# Patient Record
Sex: FEMALE | Race: WHITE | NOT HISPANIC OR LATINO | Employment: OTHER | URBAN - METROPOLITAN AREA
[De-identification: names, ages, dates, MRNs, and addresses within clinical notes are randomized per-mention and may not be internally consistent; named-entity substitution may affect disease eponyms.]

---

## 2019-07-05 ENCOUNTER — TELEPHONE (OUTPATIENT)
Dept: FAMILY MEDICINE | Facility: CLINIC | Age: 74
End: 2019-07-05

## 2019-07-08 ENCOUNTER — OFFICE VISIT (OUTPATIENT)
Dept: FAMILY MEDICINE | Facility: CLINIC | Age: 74
End: 2019-07-08
Payer: MEDICARE

## 2019-07-08 VITALS
TEMPERATURE: 98.2 F | BODY MASS INDEX: 25.67 KG/M2 | WEIGHT: 119 LBS | RESPIRATION RATE: 14 BRPM | HEIGHT: 57 IN | SYSTOLIC BLOOD PRESSURE: 120 MMHG | DIASTOLIC BLOOD PRESSURE: 86 MMHG | OXYGEN SATURATION: 98 % | HEART RATE: 92 BPM

## 2019-07-08 DIAGNOSIS — R06.89 OTHER ABNORMALITIES OF BREATHING: ICD-10-CM

## 2019-07-08 DIAGNOSIS — Z85.42 HISTORY OF ENDOMETRIAL CANCER: ICD-10-CM

## 2019-07-08 DIAGNOSIS — Z00.00 MEDICARE ANNUAL WELLNESS VISIT, SUBSEQUENT: Primary | ICD-10-CM

## 2019-07-08 DIAGNOSIS — R60.0 LOWER EXTREMITY EDEMA: ICD-10-CM

## 2019-07-08 DIAGNOSIS — N39.45 CONTINUOUS LEAKAGE OF URINE: ICD-10-CM

## 2019-07-08 LAB
ALBUMIN SERPL-MCNC: 3.9 G/DL (ref 3.4–5)
ALP SERPL-CCNC: 76 U/L (ref 40–150)
ALT SERPL W P-5'-P-CCNC: 18 U/L (ref 0–50)
ANION GAP SERPL CALCULATED.3IONS-SCNC: 4 MMOL/L (ref 3–14)
AST SERPL W P-5'-P-CCNC: 14 U/L (ref 0–45)
BASOPHILS # BLD AUTO: 0 10E9/L (ref 0–0.2)
BASOPHILS NFR BLD AUTO: 0.7 %
BILIRUB SERPL-MCNC: 0.5 MG/DL (ref 0.2–1.3)
BUN SERPL-MCNC: 12 MG/DL (ref 7–30)
CALCIUM SERPL-MCNC: 9.5 MG/DL (ref 8.5–10.1)
CANCER AG125 SERPL-ACNC: 20 U/ML (ref 0–30)
CHLORIDE SERPL-SCNC: 107 MMOL/L (ref 94–109)
CO2 SERPL-SCNC: 28 MMOL/L (ref 20–32)
CREAT SERPL-MCNC: 0.65 MG/DL (ref 0.52–1.04)
DIFFERENTIAL METHOD BLD: NORMAL
EOSINOPHIL # BLD AUTO: 0 10E9/L (ref 0–0.7)
EOSINOPHIL NFR BLD AUTO: 0.9 %
ERYTHROCYTE [DISTWIDTH] IN BLOOD BY AUTOMATED COUNT: 12.9 % (ref 10–15)
GFR SERPL CREATININE-BSD FRML MDRD: 88 ML/MIN/{1.73_M2}
GLUCOSE SERPL-MCNC: 90 MG/DL (ref 70–99)
HCT VFR BLD AUTO: 39 % (ref 35–47)
HGB BLD-MCNC: 12.7 G/DL (ref 11.7–15.7)
LYMPHOCYTES # BLD AUTO: 1 10E9/L (ref 0.8–5.3)
LYMPHOCYTES NFR BLD AUTO: 22.8 %
MCH RBC QN AUTO: 30.8 PG (ref 26.5–33)
MCHC RBC AUTO-ENTMCNC: 32.6 G/DL (ref 31.5–36.5)
MCV RBC AUTO: 94 FL (ref 78–100)
MONOCYTES # BLD AUTO: 0.4 10E9/L (ref 0–1.3)
MONOCYTES NFR BLD AUTO: 7.7 %
NEUTROPHILS # BLD AUTO: 3.1 10E9/L (ref 1.6–8.3)
NEUTROPHILS NFR BLD AUTO: 67.9 %
NT-PROBNP SERPL-MCNC: 95 PG/ML (ref 0–125)
PLATELET # BLD AUTO: 314 10E9/L (ref 150–450)
POTASSIUM SERPL-SCNC: 3.6 MMOL/L (ref 3.4–5.3)
PROT SERPL-MCNC: 7.2 G/DL (ref 6.8–8.8)
RBC # BLD AUTO: 4.13 10E12/L (ref 3.8–5.2)
SODIUM SERPL-SCNC: 139 MMOL/L (ref 133–144)
WBC # BLD AUTO: 4.6 10E9/L (ref 4–11)

## 2019-07-08 PROCEDURE — 83880 ASSAY OF NATRIURETIC PEPTIDE: CPT | Performed by: FAMILY MEDICINE

## 2019-07-08 PROCEDURE — 86304 IMMUNOASSAY TUMOR CA 125: CPT | Performed by: FAMILY MEDICINE

## 2019-07-08 PROCEDURE — 36415 COLL VENOUS BLD VENIPUNCTURE: CPT | Performed by: FAMILY MEDICINE

## 2019-07-08 PROCEDURE — 80053 COMPREHEN METABOLIC PANEL: CPT | Performed by: FAMILY MEDICINE

## 2019-07-08 PROCEDURE — 85025 COMPLETE CBC W/AUTO DIFF WBC: CPT | Performed by: FAMILY MEDICINE

## 2019-07-08 PROCEDURE — 99213 OFFICE O/P EST LOW 20 MIN: CPT | Mod: 25 | Performed by: FAMILY MEDICINE

## 2019-07-08 PROCEDURE — G0439 PPPS, SUBSEQ VISIT: HCPCS | Performed by: FAMILY MEDICINE

## 2019-07-08 SDOH — HEALTH STABILITY: MENTAL HEALTH: HOW OFTEN DO YOU HAVE A DRINK CONTAINING ALCOHOL?: NEVER

## 2019-07-08 ASSESSMENT — ACTIVITIES OF DAILY LIVING (ADL): CURRENT_FUNCTION: NO ASSISTANCE NEEDED

## 2019-07-08 ASSESSMENT — MIFFLIN-ST. JEOR: SCORE: 918.66

## 2019-07-08 NOTE — LETTER
July 9, 2019      Meera Ruvalcaba  [address] [address]  [facility] [address]        Dear ,    We are writing to inform you of your test results.  Results are within normal parameters.    Resulted Orders      Result Value Ref Range     20 0 - 30 U/mL      Comment:      Assay Method:  Chemiluminescence using Siemens Centaur XP   **Comprehensive metabolic panel FUTURE anytime   Result Value Ref Range    Sodium 139 133 - 144 mmol/L    Potassium 3.6 3.4 - 5.3 mmol/L    Chloride 107 94 - 109 mmol/L    Carbon Dioxide 28 20 - 32 mmol/L    Anion Gap 4 3 - 14 mmol/L    Glucose 90 70 - 99 mg/dL      Comment:      Fasting specimen    Urea Nitrogen 12 7 - 30 mg/dL    Creatinine 0.65 0.52 - 1.04 mg/dL    GFR Estimate 88 >60 mL/min/[1.73_m2]      Comment:      Non  GFR Calc  Starting 12/18/2018, serum creatinine based estimated GFR (eGFR) will be   calculated using the Chronic Kidney Disease Epidemiology Collaboration   (CKD-EPI) equation.      GFR Estimate If Black >90 >60 mL/min/[1.73_m2]      Comment:       GFR Calc  Starting 12/18/2018, serum creatinine based estimated GFR (eGFR) will be   calculated using the Chronic Kidney Disease Epidemiology Collaboration   (CKD-EPI) equation.      Calcium 9.5 8.5 - 10.1 mg/dL    Bilirubin Total 0.5 0.2 - 1.3 mg/dL    Albumin 3.9 3.4 - 5.0 g/dL    Protein Total 7.2 6.8 - 8.8 g/dL    Alkaline Phosphatase 76 40 - 150 U/L    ALT 18 0 - 50 U/L    AST 14 0 - 45 U/L   CBC with platelets and differential   Result Value Ref Range    WBC 4.6 4.0 - 11.0 10e9/L    RBC Count 4.13 3.8 - 5.2 10e12/L    Hemoglobin 12.7 11.7 - 15.7 g/dL    Hematocrit 39.0 35.0 - 47.0 %    MCV 94 78 - 100 fl    MCH 30.8 26.5 - 33.0 pg    MCHC 32.6 31.5 - 36.5 g/dL    RDW 12.9 10.0 - 15.0 %    Platelet Count 314 150 - 450 10e9/L    % Neutrophils 67.9 %    % Lymphocytes 22.8 %    % Monocytes 7.7 %    % Eosinophils 0.9 %    % Basophils 0.7 %    Absolute Neutrophil 3.1  1.6 - 8.3 10e9/L    Absolute Lymphocytes 1.0 0.8 - 5.3 10e9/L    Absolute Monocytes 0.4 0.0 - 1.3 10e9/L    Absolute Eosinophils 0.0 0.0 - 0.7 10e9/L    Absolute Basophils 0.0 0.0 - 0.2 10e9/L    Diff Method Automated Method    BNP-N terminal pro   Result Value Ref Range    N-Terminal Pro Bnp 95 0 - 125 pg/mL      Comment:         Reference range shown and results flagged as abnormal are for the outpatient,   non acute settings. Establishing a baseline value for each individual patient   is useful for follow-up.  Suggested inpatient cut points for confirming diagnosis of CHF in an acute   setting are:   >450 pg/mL (age 18 to less than 50)   >900 pg/mL (age 50 to less than 75)   >1800 pg/mL (75 yrs and older)  An inpatient or emergency department NT-proPBNP <300 pg/mL effectively rules   out acute CHF, with 99% negative predictive value.      If you have any questions or concerns, please call the clinic at the number listed above.     Sincerely,        Herminia Langley MD/bmc

## 2019-07-08 NOTE — PATIENT INSTRUCTIONS
Patient Education   Personalized Prevention Plan  You are due for the preventive services outlined below.  Your care team is available to assist you in scheduling these services.  If you have already completed any of these items, please share that information with your care team to update in your medical record.  Health Maintenance Due   Topic Date Due     Osteoporosis Screening  1945     Hepatitis C Screening  1945     Discuss Advance Directive Planning  1945     Mammogram  1945     Colonscopy  12/11/1955     Diptheria Tetanus Pertussis (DTAP/TDAP/TD) Vaccine (1 - Tdap) 12/11/1970     Cholesterol Lab  12/11/1990     Zoster (Shingles) Vaccine (1 of 2) 12/11/1995     Annual Wellness Visit  12/11/2010     FALL RISK ASSESSMENT  12/11/2010     Pneumococcal Vaccine (1 of 2 - PCV13) 12/11/2010     PHQ-2  01/01/2019

## 2019-07-08 NOTE — PROGRESS NOTES
"SUBJECTIVE:   Meera Ruvalcaba is a 73 year old female who presents for Preventive Visit.        Chief Complaint   Patient presents with     Wellness Visit     Health Maintenance     Patient refused all HM   Patient is a 73-year-old female who comes in to [facility] care and for her wellness visit.  She has a past medical history that is significant for endometrial carcinoma grade II with tumor invasion into the myometrium. This was diagnosed in 2007 . She had a total abdominal hysterectomy and bilateral oophorectomy in August 2007. She was following Dr. Melara at the [facility] for her cancer treatment but has not been to him since 2008.  She has also not had any medical care since 2008.  She also had an aortic lymphadenectomy and fausto pelvic lymphadenectomy as part of her surgery and subsequently developed severe lower extremity edema.      She reports that she developed lower extremity swelling following the surgery and she was wearing  compression stockings and had some form of lymphedema treatment but as stated above this patient has not been seen a doctor in over 10 years.  She reports that the lower extremity swelling has gotten worse in the last few years.  She denies any shortness of breath.  There is no history of any heart problems.    She also has a history of depression but has symptoms seem stable at this time.    Patient is also dealing with severe incontinence ,she has grade 4 cystocele and she says the incontinence has gotten really severe as well in the last few years. She is requesting depends for this.   Are you in the first 12 months of your Medicare coverage?  No    Healthy Habits:     In general, how would you rate your overall health?  Fair    Frequency of exercise:  None    Duration of exercise:  Less than 15 minutes    Do you usually eat at least 4 servings of fruit and vegetables a day, include whole grains    & fiber and avoid regularly eating high fat or \"junk\" foods?  Yes    " Taking medications regularly:  Not Applicable    Barriers to taking medications:  Not applicable    Medication side effects:  Not applicable    Ability to successfully perform activities of daily living:  No assistance needed    Home Safety:  No safety concerns identified    Hearing Impairment:  No hearing concerns    In the past 6 months, have you been bothered by leaking of urine? Yes    In general, how would you rate your overall mental or emotional health?  Fair      PHQ-2 Total Score: 1    Additional concerns today:  Yes    Do you feel safe in your environment? Yes    Do you have a Health Care Directive? Yes: Patient states has Advance Directive and will bring in a copy to clinic.      Fall risk  Fallen 2 or more times in the past year?: No  Any fall with injury in the past year?: No    Cognitive Screening   1) Repeat 3 items (Leader, Season, Table)    2) Clock draw: NORMAL  3) 3 item recall: Recalls 3 objects  Results: 3 items recalled: COGNITIVE IMPAIRMENT LESS LIKELY    Mini-CogTM Copyright JOSE Dixon. Licensed by the author for use in [facility]; reprinted with permission ([email or web address]@.[facility]). All rights reserved.      Do you have sleep apnea, excessive snoring or daytime drowsiness?: yes    Reviewed and updated as needed this visit by clinical staff  Tobacco  Allergies  Meds  Problems  Med Hx  Surg Hx  Fam Hx  Soc Hx          Reviewed and updated as needed this visit by Provider  Tobacco  Allergies  Meds  Problems  Med Hx  Surg Hx  Fam Hx        Social History     Tobacco Use     Smoking status: Never Smoker     Smokeless tobacco: Never Used   Substance Use Topics     Alcohol use: Never     Frequency: Never     If you drink alcohol do you typically have >3 drinks per day or >7 drinks per week? No    Alcohol Use 7/8/2019   Prescreen: >3 drinks/day or >7 drinks/week? No         Handicap application   Concern - incontinence and supplies for this   Onset: ongoing increased in the last 6 mos      Description:   Patient is having problems with stool and urine leakage she has had it for a while but it is increasing and would like to try and get na rx for depends     Intensity: moderate    Progression of Symptoms:  worsening    Accompanying Signs & Symptoms:  Age and surgeries     Previous history of similar problem:   Yes     Precipitating factors:   Worsened by: now she is having stool problems to    Alleviating factors:  Improved by: none     Therapies Tried and outcome: patient does wear the depend and would like a rx for them       Current providers sharing in care for this patient include:   Patient Care Team:  No Ref-Primary, Physician as PCP - General    The following health maintenance items are reviewed in Epic and correct as of today:  Health Maintenance   Topic Date Due     DEXA  1945     HEPATITIS C SCREENING  1945     ADVANCE CARE PLANNING  1945     MAMMO SCREENING  1945     COLONOSCOPY  12/11/1955     DTAP/TDAP/TD IMMUNIZATION (1 - Tdap) 12/11/1970     LIPID  12/11/1990     ZOSTER IMMUNIZATION (1 of 2) 12/11/1995     MEDICARE ANNUAL WELLNESS VISIT  12/11/2010     FALL RISK ASSESSMENT  12/11/2010     PHQ-2  01/01/2019     INFLUENZA VACCINE (1) 09/01/2019     IPV IMMUNIZATION  Aged Out     MENINGITIS IMMUNIZATION  Aged Out     Lab work is in process  Labs reviewed in EPIC  BP Readings from Last 3 Encounters:   07/08/19 120/86    Wt Readings from Last 3 Encounters:   07/08/19 54 kg (119 lb)                  There is no problem list on file for this patient.    History reviewed. No pertinent surgical history.    Social History     Tobacco Use     Smoking status: Never Smoker     Smokeless tobacco: Never Used   Substance Use Topics     Alcohol use: Never     Frequency: Never     Family History   Problem Relation Age of Onset     Cancer Mother         breast and lung     Cancer Father         bone     Prostate Cancer Father      Cancer Maternal Grandfather      Prostate  "Cancer Maternal Grandfather      Cancer Brother          Current Outpatient Medications   Medication Sig Dispense Refill     order for DME Equipment being ordered: Depends 1 Box 3     Allergies   Allergen Reactions     Sulfa Drugs Other (See Comments)     Childhood reaction      Pneumonia Vaccine:Adults age 65+ who received Pneumovax (PPSV23) at 65 years or older: Should be given PCV13 > 1 year after their most recent PPSV23  Mammogram Screening: Patient over age 75, has elected to stop mammography screening.  History of abnormal Pap smear: Status post benign hysterectomy. Health Maintenance and Surgical History updated.    Review of Systems  Constitutional, HEENT, cardiovascular, pulmonary, gi and gu systems are negative, except as otherwise noted.    OBJECTIVE:   /86   Pulse 92   Temp 98.2  F (36.8  C) (Tympanic)   Resp 14   Ht 1.448 m (4' 9\")   Wt 54 kg (119 lb)   SpO2 98%   BMI 25.75 kg/m   Estimated body mass index is 25.75 kg/m  as calculated from the following:    Height as of this encounter: 1.448 m (4' 9\").    Weight as of this encounter: 54 kg (119 lb).  Physical Exam  GENERAL: healthy, alert and no distress  EYES: Eyes grossly normal to inspection, PERRL and conjunctivae and sclerae normal  HENT: ear canals and TM's normal, nose and mouth without ulcers or lesions  NECK: no adenopathy, no asymmetry, masses, or scars and thyroid normal to palpation  RESP: lungs clear to auscultation - no rales, rhonchi or wheezes  BREAST: normal without masses, tenderness or nipple discharge and no palpable axillary masses or adenopathy  CV: regular rate and rhythm, normal S1 S2, no S3 or S4, no murmur, click or rub, no peripheral edema and peripheral pulses strong  ABDOMEN: soft, nontender, no hepatosplenomegaly, no masses and bowel sounds normal  MS: no gross musculoskeletal defects noted, no edema  SKIN: no suspicious lesions or rashes  PSYCH: mentation appears normal, affect normal/bright    Diagnostic " "Test Results:  Pending     ASSESSMENT / PLAN:   1. Medicare annual wellness visit, subsequent  73 yr old female here for her medicare well ness exam with past medical history that is significant for history of endometrial cancer and depression.     2. History of endometrial cancer  -   - JUST IN CASE    3. Lower extremity edema  - Comprehensive metabolic panel FUTURE anytime  - CBC with platelets and differential  - LYMPHEDEMA THERAPY REFERRAL; Future  - JUST IN CASE    4. Continuous leakage of urine  Depends ordered - she may need a consult with Uro- gyn   - order for DME; Equipment being ordered: Depends  Dispense: 1 Box; Refill: 3  - JUST IN CASE    End of Life Planning:  Patient currently has an advanced directive: Yes.  Practitioner is supportive of decision.    COUNSELING:  Reviewed preventive health counseling, as reflected in patient instructions       Regular exercise       Healthy diet/nutrition       Vision screening    Estimated body mass index is 25.75 kg/m  as calculated from the following:    Height as of this encounter: 1.448 m (4' 9\").    Weight as of this encounter: 54 kg (119 lb).         reports that she has never smoked. She has never used smokeless tobacco.      Appropriate preventive services were discussed with this patient, including applicable screening as appropriate for cardiovascular disease, diabetes, osteopenia/osteoporosis, and glaucoma.  As appropriate for age/gender, discussed screening for colorectal cancer, prostate cancer, breast cancer, and cervical cancer. Checklist reviewing preventive services available has been given to the patient.    Reviewed patients plan of care and provided an AVS. The Basic Care Plan (routine screening as documented in Health Maintenance) for Meera meets the Care Plan requirement. This Care Plan has been established and reviewed with the Patient.    Counseling Resources:  ATP IV Guidelines  Pooled Cohorts Equation Calculator  Breast Cancer Risk " Calculator  FRAX Risk Assessment  ICSI Preventive Guidelines  Dietary Guidelines for Americans, 2010  USDA's MyPlate  ASA Prophylaxis  Lung CA Screening    Herminia Langley MD  [facility]    Identified Health Risks:

## 2019-07-09 NOTE — RESULT ENCOUNTER NOTE
Please inform patient that test result was within normal parameters.   Thank you.     Herminia Langley M.D.

## 2019-07-23 ENCOUNTER — HOSPITAL ENCOUNTER (OUTPATIENT)
Dept: PHYSICAL THERAPY | Facility: CLINIC | Age: 74
Setting detail: THERAPIES SERIES
End: 2019-07-23
Attending: FAMILY MEDICINE
Payer: MEDICARE

## 2019-07-23 DIAGNOSIS — R60.0 LOWER EXTREMITY EDEMA: ICD-10-CM

## 2019-07-23 PROCEDURE — 97140 MANUAL THERAPY 1/> REGIONS: CPT | Mod: GP | Performed by: PHYSICAL THERAPIST

## 2019-07-23 PROCEDURE — 97161 PT EVAL LOW COMPLEX 20 MIN: CPT | Mod: GP | Performed by: PHYSICAL THERAPIST

## 2019-07-23 NOTE — PROGRESS NOTES
Lymphedema Evaluation  07/23/19 1300   Quick Adds   Quick Adds Certification   Rehab Discipline   Discipline PT   Type of Visit   Type of visit Initial Edema Evaluation   General Information   Start of care 07/23/19   Referring physician Dr. Langley   Orders Evaluate and treat as indicated   Order date 07/08/19   Medical diagnosis Lower extremity edema   Onset of illness / date of surgery   (August 2007)   Edema onset 07/08/19  (date of referral; Onset September 2007)   Affected body parts RLE;LLE  (R LE > L LE)   Edema etiology Surgery;Cancer with lymph node dissection;Radiation   Location - Cancer with lymph node dissection B groin   Location - Radiation abdomen   Edema etiology comments denies hx of DVTs   Pertinent history of current problem (PT: include personal factors and/or comorbidities that impact the POC; OT: include additional occupational profile info) Pateint reports that she has dealt with edema for years since her surgery in 2007.  Reports her swelling isn't as bad in the winter and gets worse in the summers.  Reports her edema gets worse in the evenings and better with sleeping.   Surgical / medical history reviewed Yes   Prior level of functional mobility independent with AD   Prior treatment ASHLY hose;[facility]   Community support Family / friend caregiver   Patient role / employment history Retired   Living environment House / [facility]   Living environment comments 3-6 TANIA; Flight of stairs to get to the bedroom   Current assistive devices Walker;Standard cane   Fall Risk Screen   Fall screen completed by PT   Have you fallen 2 or more times in the past year? No   Have you fallen and had an injury in the past year? No   Is patient a fall risk? No   Abuse Screen (yes response referral indicated)   Feels Unsafe at Home or Work/School no   Feels Threatened by Someone no   Does Anyone Try to Keep You From Having Contact with Others or Doing Things Outside Your Home? no   Physical Signs of Abuse  "Present no   System Outcome Measures   Outcome Measures Lymphedema   Lymphedema Life Impact Scale (score range 0-72). A higher score indicates greater impairment. 39   Subjective Report   Patient report of symptoms \"tight, full, and heavy\"   Precautions   Precautions comments Denies heart or kidney concerns   Patient / Family Goals   Patient / family goals statement to decrease the swelling   Pain   Patient currently in pain No   Vitals Signs   Heart Rate 89   SpO2 99   Cognitive Status   Orientation Orientation to person, place and time   Level of consciousness Alert   Follows commands and answers questions 100% of the time   Personal safety and judgement Intact   Memory Intact   Edema Exam / Assessment   Skin condition Dryness   Scar No   Stemmer sign Negative   Ulceration No   Girth Measurements   Girth Measurements Refer to separate girth measurement flowsheet   Volume LE   Right LE (mL) 7214.35   Left LE (mL) 5706.85   LE volume comparison RLE volume greater than LLE volume   % difference 20.9%   Range of Motion   ROM No deficits were identified   Strength   Strength No deficits were identified   Right hand  (pounds) n=   Posture   Posture Forward head position   Posture comments seated in w/c for session   Palpation   Palpation denies   Sensory   Sensory perception Light touch   Light touch Intact   Planned Edema Interventions   Planned edema interventions Manual lymph drainage;Gradient compression bandaging;Fit for compression garment;Exercises;Precautions to prevent infection / exacerbation;Education;Manual therapy;ADL training;Skin care / precautions;Scar mobilization;Soft tissue mobilization;Myofascial release;Home management program development   Clinical Impression   Criteria for skilled therapeutic intervention met Yes   Therapy diagnosis B LE secondary lymphedema   Influenced by the following impairments / conditions Edema;Stage 1;Stage 2   Functional limitations due to impairments / conditions " decreased fit of clothing and shoes   Clinical Presentation Stable/Uncomplicated   Clinical Presentation Rationale hx of endometrial cancer 2007; lymph node removal; radiation   Clinical Decision Making (Complexity) Low complexity   Treatment Frequency 2x/week   Treatment duration 12 weeks   Patient / family and/or staff in agreement with plan of care Yes   Risks and benefits of therapy have been explained Yes   Goals   Edema Eval Goals 1;2;3;4   Goal 1   Goal identifier stg 1   Goal description pt to have around the clock tolerance to BLE spandigrip and/or GCB for edema reduction response   Target date 08/13/19   Goal 2   Goal identifier stg 2   Goal description pt and/or friend to be independent with BLE spandigrip and/or GCB for edema reduction response   Target date 08/20/19   Goal 3   Goal identifier ltg 1   Goal description pt to be independent with longterm BLE lymphedema management via HEP, elevation, compression garment wear and MLD (when/if appropriate)   Target date 10/15/19   Goal 4   Goal identifier ltg 2   Goal description pt to have at least 3-5 point improvement on LLIS due to lymphedema reduction response in BLE   Target date 10/15/19   Total Evaluation Time   PT Eval, Low Complexity Minutes (39506) 40   Certification   Certification date from 07/23/19   Certification date to 10/15/19   Medical Diagnosis Lower extremity edema   Certification I certify the need for these services furnished under this plan of treatment and while under my care.  (Physician co-signature of this document indicates review and certification of the therapy plan).    Please contact me with any questions or concerns.    Thank you for your referral,     Shayy Obregon, PT, DPT, CLT  Physical Therapist & Certified Lymphedema Therapist  [facility] - [address] [address] [phone]

## 2019-07-23 NOTE — PROGRESS NOTES
[facility]        OUTPATIENT PHYSICAL THERAPY EDEMA EVALUATION  PLAN OF TREATMENT FOR OUTPATIENT REHABILITATION  (COMPLETE FOR INITIAL CLAIMS ONLY)  Patient's Last Name, First Name, Meera Traylor                           Provider s Name:   [facility] Medical Record No.  [id number]     Start of Care Date:  07/23/19   Onset Date:  07/08/19(date of referral; Onset September 2007)   Type:  PT   Medical Diagnosis:  Lower extremity edema   Therapy Diagnosis:  B LE secondary lymphedema Visits from SOC:  1                                     __________________________________________________________________________________   Plan of Treatment/Functional Goals:    Manual lymph drainage, Gradient compression bandaging, Fit for compression garment, Exercises, Precautions to prevent infection / exacerbation, Education, Manual therapy, ADL training, Skin care / precautions, Scar mobilization, Soft tissue mobilization, Myofascial release, Home management program development        GOALS  1. Goal description: pt to have around the clock tolerance to BLE spandigrip and/or GCB for edema reduction response       Target date: 08/13/19  2. Goal description: pt and/or friend to be independent with BLE spandigrip and/or GCB for edema reduction response       Target date: 08/20/19  3. Goal description: pt to be independent with longterm BLE lymphedema management via HEP, elevation, compression garment wear and MLD (when/if appropriate)       Target date: 10/15/19  4. Goal description: pt to have at least 3-5 point improvement on LLIS due to lymphedema reduction response in BLE       Target date: 10/15/19      Treatment Frequency: 2x/week   Treatment duration: 12 weeks    Shayy Obregon, PT                                    I CERTIFY THE NEED FOR THESE SERVICES FURNISHED UNDER         THIS PLAN OF TREATMENT AND WHILE UNDER MY CARE     (Physician co-signature of this document indicates review and certification of the therapy plan).                   Certification date from: 07/23/19       Certification date to: 10/15/19           Referring physician: Dr. Langley   Initial Assessment  See Epic Evaluation- Start of care: 07/23/19

## 2019-07-29 ENCOUNTER — HOSPITAL ENCOUNTER (OUTPATIENT)
Dept: PHYSICAL THERAPY | Facility: CLINIC | Age: 74
Setting detail: THERAPIES SERIES
End: 2019-07-29
Attending: FAMILY MEDICINE
Payer: MEDICARE

## 2019-07-29 PROCEDURE — 97140 MANUAL THERAPY 1/> REGIONS: CPT | Mod: GP | Performed by: REHABILITATION PRACTITIONER

## 2019-08-29 ENCOUNTER — TELEPHONE (OUTPATIENT)
Dept: FAMILY MEDICINE | Facility: CLINIC | Age: 74
End: 2019-08-29

## 2019-08-29 NOTE — TELEPHONE ENCOUNTER
Panel Management Review      Patient has the following on her problem list:       Composite cancer screening  Chart review shows that this patient is due/due soon for the following Mammogram, Colonoscopy and Fecal Colorectal (FIT)  Summary:    Patient is due/failing the following:   COLONOSCOPY, FIT, LDL and MAMMOGRAM    Action needed:   Patient was called and declined Mammo and colon /fit she will come in and do blood test     Type of outreach:    Phone, spoke to patient.  she declined mammo and fit will come in and do blood test     Questions for provider review:    None                                                                                                                                    Anahi Hurtado CMA     Chart routed to  .

## 2019-10-22 NOTE — PROGRESS NOTES
Outpatient Physical Therapy Discharge Note     Patient: Meera Ruvalcaba  : 1945    Beginning/End Dates of Reporting Period:  2019 to 10/15/2019    Referring Provider: Dr. Shivam MD    Therapy Diagnosis: BLE secondary lymphedema      Client Self Report: removed R stockinet last night because it became uncomfortable and removed L LE stockinet at the salon today before getting a pedicure.    Objective Measurements:  Objective Measure: girth  Details: R LE -9.9%, L LE -10.4%     Outcome Measures (most recent score):   LLIS = 39 on date of initial evaluation; pt didn't return to clinic for final appt so unable to again complete LLIS     Goals:  Goal Identifier stg 1   Goal Description pt to have around the clock tolerance to BLE spandigrip and/or GCB for edema reduction response   Target Date 19   Date Met      Progress:     Goal Identifier stg 2   Goal Description pt and/or friend to be independent with BLE spandigrip and/or GCB for edema reduction response   Target Date 19   Date Met      Progress:     Goal Identifier ltg 1   Goal Description pt to be independent with longterm BLE lymphedema management via HEP, elevation, compression garment wear and MLD (when/if appropriate)   Target Date 10/15/19   Date Met      Progress:     Goal Identifier ltg 2   Goal Description pt to have at least 3-5 point improvement on LLIS due to lymphedema reduction response in BLE   Target Date 10/15/19   Date Met      Progress:     Progress Toward Goals:   Progress limited due to patient only seen in clinic 2x, with last appt on 19 and then cancelled all remaining appointments.      Plan:  Discharge from therapy.    Discharge:    Reason for Discharge: Patient has failed to schedule further appointments.    Equipment Issued: none    Discharge Plan: Patient to continue home program.

## 2019-10-22 NOTE — ADDENDUM NOTE
Encounter addended by: Pam Baker, PT on: 10/22/2019 1:15 PM   Actions taken: Clinical Note Signed, Episode resolved

## 2020-05-19 ENCOUNTER — TELEPHONE (OUTPATIENT)
Dept: FAMILY MEDICINE | Facility: CLINIC | Age: 75
End: 2020-05-19

## 2020-05-19 DIAGNOSIS — N39.45 CONTINUOUS LEAKAGE OF URINE: ICD-10-CM

## 2020-05-19 NOTE — TELEPHONE ENCOUNTER
Reason for Call:  Other prescription    Detailed comments: Patient needs Depends, would like Dr. Langley to send a prescription or order to fax # [phone]     Phone Number Patient can be reached at: Home number on file [phone] (home)    Best Time: Any    Can we leave a detailed message on this number? YES    Call taken on 5/19/2020 at 12:12 PM by Cara Mccloud

## 2020-05-19 NOTE — TELEPHONE ENCOUNTER
Dr. Langley,    Patient asking for refill on her depends.  Patient uses 2-3 per day.  Equal out to Maximum of 90 per month.  Per patient a box is 12 depends.  Emelina BENITEZ RN

## 2020-05-20 NOTE — TELEPHONE ENCOUNTER
Order faxed to [phone] per patient request, patient notified. Cara Mccloud on 5/20/2020 at 9:13 AM

## 2020-09-29 ENCOUNTER — TELEPHONE (OUTPATIENT)
Dept: FAMILY MEDICINE | Facility: CLINIC | Age: 75
End: 2020-09-29

## 2020-09-29 NOTE — TELEPHONE ENCOUNTER
Reason for Call:  Other Wants information sent to her      Detailed comments: Patient wants any material we have on incontenence sent to her.    Phone Number Patient can be reached at: Home number on file [phone] (home)    Best Time: Any    Can we leave a detailed message on this number? YES    Call taken on 9/29/2020 at 2:05 PM by Cara Mccloud

## 2020-09-29 NOTE — LETTER
[facility]  [address] [address]  [phone]    September 29, 2020    Meera Ruvalcaba                                                                                                                     [id number]  [place] [address]

## 2020-09-29 NOTE — TELEPHONE ENCOUNTER
Patient reports she wants a incontinence information printed and sent to her. Patient wants general information, printed several resources from the Epic resource tab about urinary incontinence and mailed to home address.

## 2020-12-26 ENCOUNTER — APPOINTMENT (OUTPATIENT)
Dept: CT IMAGING | Facility: CLINIC | Age: 75
End: 2020-12-26
Attending: EMERGENCY MEDICINE
Payer: COMMERCIAL

## 2020-12-26 ENCOUNTER — APPOINTMENT (OUTPATIENT)
Dept: GENERAL RADIOLOGY | Facility: CLINIC | Age: 75
End: 2020-12-26
Attending: EMERGENCY MEDICINE
Payer: COMMERCIAL

## 2020-12-26 ENCOUNTER — HOSPITAL ENCOUNTER (OUTPATIENT)
Facility: CLINIC | Age: 75
Setting detail: OBSERVATION
Discharge: NURSING, REHAB OR LONG-TERM CARE | End: 2020-12-28
Attending: EMERGENCY MEDICINE | Admitting: INTERNAL MEDICINE
Payer: COMMERCIAL

## 2020-12-26 DIAGNOSIS — R29.6 RECURRENT FALLS: ICD-10-CM

## 2020-12-26 DIAGNOSIS — K59.00 CONSTIPATION, UNSPECIFIED CONSTIPATION TYPE: ICD-10-CM

## 2020-12-26 DIAGNOSIS — R53.1 GENERALIZED WEAKNESS: ICD-10-CM

## 2020-12-26 DIAGNOSIS — R52 PAIN: Primary | ICD-10-CM

## 2020-12-26 DIAGNOSIS — G47.00 INSOMNIA, UNSPECIFIED TYPE: ICD-10-CM

## 2020-12-26 LAB
ALBUMIN SERPL-MCNC: 3.3 G/DL (ref 3.4–5)
ALBUMIN UR-MCNC: 50 MG/DL
ALP SERPL-CCNC: 85 U/L (ref 40–150)
ALT SERPL W P-5'-P-CCNC: 16 U/L (ref 0–50)
ANION GAP SERPL CALCULATED.3IONS-SCNC: 5 MMOL/L (ref 3–14)
APPEARANCE UR: ABNORMAL
AST SERPL W P-5'-P-CCNC: 27 U/L (ref 0–45)
BACTERIA #/AREA URNS HPF: ABNORMAL /HPF
BASOPHILS # BLD AUTO: 0 10E9/L (ref 0–0.2)
BASOPHILS NFR BLD AUTO: 0.1 %
BILIRUB SERPL-MCNC: 1.1 MG/DL (ref 0.2–1.3)
BILIRUB UR QL STRIP: NEGATIVE
BUN SERPL-MCNC: 14 MG/DL (ref 7–30)
CALCIUM SERPL-MCNC: 9.4 MG/DL (ref 8.5–10.1)
CHLORIDE SERPL-SCNC: 103 MMOL/L (ref 94–109)
CK SERPL-CCNC: 628 U/L (ref 30–225)
CO2 SERPL-SCNC: 29 MMOL/L (ref 20–32)
COLOR UR AUTO: YELLOW
CREAT SERPL-MCNC: 0.54 MG/DL (ref 0.52–1.04)
DIFFERENTIAL METHOD BLD: ABNORMAL
EOSINOPHIL # BLD AUTO: 0 10E9/L (ref 0–0.7)
EOSINOPHIL NFR BLD AUTO: 0 %
ERYTHROCYTE [DISTWIDTH] IN BLOOD BY AUTOMATED COUNT: 12.6 % (ref 10–15)
FLUABV+SARS-COV-2+RSV PNL RESP NAA+PROBE: NEGATIVE
FLUABV+SARS-COV-2+RSV PNL RESP NAA+PROBE: NEGATIVE
GFR SERPL CREATININE-BSD FRML MDRD: >90 ML/MIN/{1.73_M2}
GLUCOSE SERPL-MCNC: 115 MG/DL (ref 70–99)
GLUCOSE UR STRIP-MCNC: NEGATIVE MG/DL
HCT VFR BLD AUTO: 39.7 % (ref 35–47)
HGB BLD-MCNC: 13.3 G/DL (ref 11.7–15.7)
HGB UR QL STRIP: ABNORMAL
IMM GRANULOCYTES # BLD: 0.1 10E9/L (ref 0–0.4)
IMM GRANULOCYTES NFR BLD: 0.6 %
KETONES UR STRIP-MCNC: 20 MG/DL
LABORATORY COMMENT REPORT: NORMAL
LACTATE BLD-SCNC: 2 MMOL/L (ref 0.7–2)
LACTATE BLD-SCNC: 2.5 MMOL/L (ref 0.7–2)
LEUKOCYTE ESTERASE UR QL STRIP: NEGATIVE
LYMPHOCYTES # BLD AUTO: 0.3 10E9/L (ref 0.8–5.3)
LYMPHOCYTES NFR BLD AUTO: 1.6 %
MAGNESIUM SERPL-MCNC: 1.6 MG/DL (ref 1.6–2.3)
MCH RBC QN AUTO: 30.6 PG (ref 26.5–33)
MCHC RBC AUTO-ENTMCNC: 33.5 G/DL (ref 31.5–36.5)
MCV RBC AUTO: 91 FL (ref 78–100)
MONOCYTES # BLD AUTO: 0.4 10E9/L (ref 0–1.3)
MONOCYTES NFR BLD AUTO: 1.8 %
MUCOUS THREADS #/AREA URNS LPF: PRESENT /LPF
NEUTROPHILS # BLD AUTO: 19.8 10E9/L (ref 1.6–8.3)
NEUTROPHILS NFR BLD AUTO: 95.9 %
NITRATE UR QL: NEGATIVE
NRBC # BLD AUTO: 0 10*3/UL
NRBC BLD AUTO-RTO: 0 /100
PH UR STRIP: 6 PH (ref 5–7)
PLATELET # BLD AUTO: 225 10E9/L (ref 150–450)
POTASSIUM SERPL-SCNC: 2.8 MMOL/L (ref 3.4–5.3)
POTASSIUM SERPL-SCNC: 3.1 MMOL/L (ref 3.4–5.3)
PROT SERPL-MCNC: 7 G/DL (ref 6.8–8.8)
RBC # BLD AUTO: 4.35 10E12/L (ref 3.8–5.2)
RBC #/AREA URNS AUTO: 48 /HPF (ref 0–2)
RSV RNA SPEC QL NAA+PROBE: NORMAL
SARS-COV-2 RNA SPEC QL NAA+PROBE: NEGATIVE
SODIUM SERPL-SCNC: 137 MMOL/L (ref 133–144)
SOURCE: ABNORMAL
SP GR UR STRIP: 1.02 (ref 1–1.03)
SPECIMEN SOURCE: NORMAL
TROPONIN I SERPL-MCNC: <0.015 UG/L (ref 0–0.04)
UROBILINOGEN UR STRIP-MCNC: 2 MG/DL (ref 0–2)
WBC # BLD AUTO: 20.7 10E9/L (ref 4–11)
WBC #/AREA URNS AUTO: 8 /HPF (ref 0–5)

## 2020-12-26 PROCEDURE — 81001 URINALYSIS AUTO W/SCOPE: CPT | Performed by: EMERGENCY MEDICINE

## 2020-12-26 PROCEDURE — 85025 COMPLETE CBC W/AUTO DIFF WBC: CPT | Performed by: EMERGENCY MEDICINE

## 2020-12-26 PROCEDURE — 96361 HYDRATE IV INFUSION ADD-ON: CPT | Mod: 59

## 2020-12-26 PROCEDURE — 99285 EMERGENCY DEPT VISIT HI MDM: CPT | Mod: 25

## 2020-12-26 PROCEDURE — 71045 X-RAY EXAM CHEST 1 VIEW: CPT

## 2020-12-26 PROCEDURE — 258N000003 HC RX IP 258 OP 636: Performed by: PHYSICIAN ASSISTANT

## 2020-12-26 PROCEDURE — 36415 COLL VENOUS BLD VENIPUNCTURE: CPT | Performed by: EMERGENCY MEDICINE

## 2020-12-26 PROCEDURE — 70450 CT HEAD/BRAIN W/O DYE: CPT

## 2020-12-26 PROCEDURE — 82550 ASSAY OF CK (CPK): CPT | Performed by: EMERGENCY MEDICINE

## 2020-12-26 PROCEDURE — 87086 URINE CULTURE/COLONY COUNT: CPT | Performed by: PHYSICIAN ASSISTANT

## 2020-12-26 PROCEDURE — G0378 HOSPITAL OBSERVATION PER HR: HCPCS

## 2020-12-26 PROCEDURE — 80053 COMPREHEN METABOLIC PANEL: CPT | Performed by: EMERGENCY MEDICINE

## 2020-12-26 PROCEDURE — 84484 ASSAY OF TROPONIN QUANT: CPT | Performed by: EMERGENCY MEDICINE

## 2020-12-26 PROCEDURE — 87636 SARSCOV2 & INF A&B AMP PRB: CPT | Performed by: EMERGENCY MEDICINE

## 2020-12-26 PROCEDURE — 51701 INSERT BLADDER CATHETER: CPT

## 2020-12-26 PROCEDURE — 93005 ELECTROCARDIOGRAM TRACING: CPT | Mod: 59

## 2020-12-26 PROCEDURE — 99220 PR INITIAL OBSERVATION CARE,LEVEL III: CPT | Performed by: PHYSICIAN ASSISTANT

## 2020-12-26 PROCEDURE — 83605 ASSAY OF LACTIC ACID: CPT | Mod: 91 | Performed by: EMERGENCY MEDICINE

## 2020-12-26 PROCEDURE — 250N000013 HC RX MED GY IP 250 OP 250 PS 637: Mod: GY | Performed by: PHYSICIAN ASSISTANT

## 2020-12-26 PROCEDURE — 96360 HYDRATION IV INFUSION INIT: CPT | Mod: 59

## 2020-12-26 PROCEDURE — 36415 COLL VENOUS BLD VENIPUNCTURE: CPT | Performed by: PHYSICIAN ASSISTANT

## 2020-12-26 PROCEDURE — 258N000003 HC RX IP 258 OP 636: Performed by: EMERGENCY MEDICINE

## 2020-12-26 PROCEDURE — 73030 X-RAY EXAM OF SHOULDER: CPT | Mod: LT

## 2020-12-26 PROCEDURE — 84132 ASSAY OF SERUM POTASSIUM: CPT | Mod: 91 | Performed by: PHYSICIAN ASSISTANT

## 2020-12-26 PROCEDURE — 83735 ASSAY OF MAGNESIUM: CPT | Performed by: PHYSICIAN ASSISTANT

## 2020-12-26 PROCEDURE — 250N000011 HC RX IP 250 OP 636: Performed by: PHYSICIAN ASSISTANT

## 2020-12-26 PROCEDURE — C9803 HOPD COVID-19 SPEC COLLECT: HCPCS

## 2020-12-26 PROCEDURE — 87040 BLOOD CULTURE FOR BACTERIA: CPT | Mod: 91 | Performed by: EMERGENCY MEDICINE

## 2020-12-26 PROCEDURE — 96372 THER/PROPH/DIAG INJ SC/IM: CPT | Mod: XS | Performed by: PHYSICIAN ASSISTANT

## 2020-12-26 RX ORDER — POTASSIUM CHLORIDE 1500 MG/1
20 TABLET, EXTENDED RELEASE ORAL ONCE
Status: COMPLETED | OUTPATIENT
Start: 2020-12-26 | End: 2020-12-26

## 2020-12-26 RX ORDER — ONDANSETRON 2 MG/ML
4 INJECTION INTRAMUSCULAR; INTRAVENOUS EVERY 6 HOURS PRN
Status: DISCONTINUED | OUTPATIENT
Start: 2020-12-26 | End: 2020-12-28 | Stop reason: HOSPADM

## 2020-12-26 RX ORDER — ACETAMINOPHEN 650 MG/1
650 SUPPOSITORY RECTAL EVERY 4 HOURS PRN
Status: DISCONTINUED | OUTPATIENT
Start: 2020-12-26 | End: 2020-12-27

## 2020-12-26 RX ORDER — POLYETHYLENE GLYCOL 3350 17 G/17G
17 POWDER, FOR SOLUTION ORAL DAILY PRN
Status: DISCONTINUED | OUTPATIENT
Start: 2020-12-26 | End: 2020-12-28 | Stop reason: HOSPADM

## 2020-12-26 RX ORDER — ONDANSETRON 4 MG/1
4 TABLET, ORALLY DISINTEGRATING ORAL EVERY 6 HOURS PRN
Status: DISCONTINUED | OUTPATIENT
Start: 2020-12-26 | End: 2020-12-28 | Stop reason: HOSPADM

## 2020-12-26 RX ORDER — LIDOCAINE 40 MG/G
CREAM TOPICAL
Status: DISCONTINUED | OUTPATIENT
Start: 2020-12-26 | End: 2020-12-28 | Stop reason: HOSPADM

## 2020-12-26 RX ORDER — SODIUM CHLORIDE, SODIUM LACTATE, POTASSIUM CHLORIDE, CALCIUM CHLORIDE 600; 310; 30; 20 MG/100ML; MG/100ML; MG/100ML; MG/100ML
INJECTION, SOLUTION INTRAVENOUS CONTINUOUS
Status: DISCONTINUED | OUTPATIENT
Start: 2020-12-26 | End: 2020-12-27

## 2020-12-26 RX ORDER — ACETAMINOPHEN 325 MG/1
650 TABLET ORAL EVERY 4 HOURS PRN
Status: DISCONTINUED | OUTPATIENT
Start: 2020-12-26 | End: 2020-12-28 | Stop reason: HOSPADM

## 2020-12-26 RX ADMIN — SODIUM CHLORIDE 1000 ML: 9 INJECTION, SOLUTION INTRAVENOUS at 12:00

## 2020-12-26 RX ADMIN — ENOXAPARIN SODIUM 40 MG: 40 INJECTION SUBCUTANEOUS at 17:37

## 2020-12-26 RX ADMIN — POTASSIUM CHLORIDE 20 MEQ: 1500 TABLET, EXTENDED RELEASE ORAL at 23:00

## 2020-12-26 RX ADMIN — ACETAMINOPHEN 650 MG: 325 TABLET, FILM COATED ORAL at 17:33

## 2020-12-26 RX ADMIN — SODIUM CHLORIDE, POTASSIUM CHLORIDE, SODIUM LACTATE AND CALCIUM CHLORIDE: 600; 310; 30; 20 INJECTION, SOLUTION INTRAVENOUS at 17:21

## 2020-12-26 RX ADMIN — POTASSIUM CHLORIDE 20 MEQ: 1500 TABLET, EXTENDED RELEASE ORAL at 22:08

## 2020-12-26 ASSESSMENT — ENCOUNTER SYMPTOMS
DIARRHEA: 0
DYSURIA: 0
VOMITING: 0

## 2020-12-26 ASSESSMENT — MIFFLIN-ST. JEOR: SCORE: 831.1

## 2020-12-26 NOTE — ED PROVIDER NOTES
History   Chief Complaint:  Social Work Services     The history is provided by the patient.      Meera Ruvalcaba is a 75 year old female with history of endometrial cancer who presents via EMS with fall and social concern. She states that she was in her hotel room and slipped off the edge of the bed yesterday. She thinks she hit her head on the bed at that time but no loss of consciousness. She also hit her left shoulder during the fall which is somewhat tender. She was able to get up. She then had another fall off the bed today, but was unable to get up. EMS was called and they found her room was noted to be covered with urine and feces. The patient reports that she is incontinent of bowel and bladder. She states the floor was slippery and she was unable to get up. She has had no vomiting, diarrhea, chest pain, dysuria, or other concerns. She denies known exposure to COVID. She is living in a hotel currently as she is homeless and has been homeless for the last month. Prior to that she was living with a friend, but is no longer able to live with them.     Review of Systems   Cardiovascular: Negative for chest pain.   Gastrointestinal: Negative for diarrhea and vomiting.   Genitourinary: Negative for dysuria.   Musculoskeletal:        Shoulder pain   Neurological: Negative for syncope.        Chronic incontinence of bowel and bladder   All other systems reviewed and are negative.      Allergies:  Sulfa Drugs    Medications:  She denies any daily medication.     Past Medical History:    endometrial cancer   Lower extremity edema      Family History:    Breast cancer  Bone cancer  Prostate cancer    Social History:  Patient presents to the ER via EMS.   Patient is currently living in a hotel as she is homeless.     Physical Exam     Patient Vitals for the past 24 hrs:   BP Temp Temp src Pulse Resp SpO2 Weight   12/26/20 1430 128/75 -- -- 121 -- -- --   12/26/20 1406 (!) 142/86 -- -- -- -- 93 % --   12/26/20 1400 (!)  142/86 -- -- -- -- -- --   12/26/20 1215 -- -- -- -- 19 -- --   12/26/20 1210 -- -- -- 103 18 98 % --   12/26/20 1200 (!) 154/60 -- -- 106 (!) 32 -- --   12/26/20 1145 138/77 -- -- 104 25 99 % --   12/26/20 1135 (!) 125/104 99  F (37.2  C) Oral 106 15 98 % 50.3 kg (111 lb)   12/26/20 1130 (!) 156/134 -- -- 113 -- -- --       Physical Exam  Constitutional: Frail and weak appearing.   HEENT: Hematoma over the left lateral eye brow. PERRL.  EOMI.  Moist mucous membranes.  Neck: Soft.  Supple.  No tenderness to palpation.  Cardiac: Regular rate and rhythm.  No murmur or rub.  Respiratory: Clear to auscultation bilaterally.  No respiratory distress.    Abdomen: Soft and nontender.  No rebound or guarding.  Nondistended.  Musculoskeletal: Bruising and tenderness over the left shoulder with no obvious deformity. Bilateral lower extremity edema. Multiple bruises throughout the body with no obvious deformity of extremities.   Neurologic: Alert and oriented x3.  Normal tone and bulk.  No facial drooping.  Normal speech. 4/5 strength in bilateral upper and lower extremities.  Sensation to light touch intact throughout.    Skin: No rashes.  See Musculoskeletal.   Psych: Normal affect.  Normal behavior.    Emergency Department Course   ECG:  ECG taken at 1140, ECG read at 1144  Sinus tachycardia  Otherwise normal ECG  Rate 106 bpm. NY interval 132 ms. QRS duration 68 ms. QT/QTc 344/456 ms. P-R-T axes 60 45 33.    Imaging:  XR Chest Port 1 View  IMPRESSION: Single portable AP view of the chest was obtained.  Cardiomediastinal silhouette is within normal limits. No suspicious  focal pulmonary opacities. Linear lucency along the lateral side of  the right chest, likely represents skinfold, which can be confirmed by  repeating the x-ray with better positioning. No significant left  pneumothorax. No significant pleural effusion. Degenerative changes of  the bilateral shoulder joints.  Reading per radiology    XR Shoulder Left G/E 3  Views  IMPRESSION: Bones are demineralized. No acute fracture or dislocation.  Mild degenerative changes. Suspect small bone island within the left  posterior fourth rib.  Reading per radiology    CT Head w/o Contrast  IMPRESSION:  1. No CT findings of acute intracranial abnormality.  2. Moderate generalized brain parenchymal volume loss and presumed  chronic small-vessel ischemic disease.  3. Mild-to-moderate ventriculomegaly, somewhat disproportionate to  degree of generalized brain parenchymal volume loss. This is likely  due to ex vacuo phenomenon in the setting of generalized atrophy and  central white matter volume loss in the setting of small-vessel  ischemic disease. A superimposed component of normal  pressure/communicating hydrocephalus cannot be completely excluded.  4. Asymmetric opacification of the right olfactory cleft, nonspecific.  This could be related to a polyp or focal mucosal thickening, although  the differential would include a neoplasm or less likely an  encephalocele. Recommend correlation with direct inspection.  Reading per radiology    Laboratory:  UA with microscopic: urineketon 20, blood small, protein albumin 50, WBC 8, RBC 48, bacteria few, mucous present o/w WNL    Symptomatic Influenza A/B & SARS-CoV2 (COVID19) Virus PCR Multiplex pending     CBC: WBC 20.7(H), HGB 13.3,   CMP: potassium 3.1(L), glucose 115(H), albumin 3.3(L) o/w WNL (Creatinine 0.54)   Lactic acid whole blood(result time 1208) 2.0   Troponin (Collected 1140): <0.015   CK total: 628(H)    Emergency Department Course:    Reviewed:  I reviewed nursing notes, vitals and past medical history    Assessments:  1135 I obtained history and examined the patient as noted above.   1413 I returned to update the patient and POA who is now in the room on the patient's findings and plan of care.     Consults:   1207 I spoke with [facility] regarding the patient's presentation.   1444 I spoke with Chichi  Ash PHILLIPS of the Hospitalist service from [facility] regarding patient's presentation, findings, and plan of care.     Interventions:  1200 NS 1000 mL IV    Disposition:  The patient is admitted into the care of Dr. Rocha.    Impression & Plan     Covid-19  Meera Ruvalcaba was evaluated during a global COVID-19 pandemic, which necessitated consideration that the patient might be at risk for infection with the SARS-CoV-2 virus that causes COVID-19.   Applicable protocols for evaluation were followed during the patient's care.   COVID-19 was considered as part of the patient's evaluation. The plan for testing is:  a test was obtained during this visit.    Medical Decision Making:  Meera Ruvalcaba is a 75-year-old woman with generalized weakness.  She is afebrile and hemodynamically stable.  She is a vulnerable adult and I was contacted by [facility] crisis unit.  He is filling out a vulnerable adult and spoken to family.  Lab work-up is noted as above.  She is a leukocytosis of unknown etiology but may be secondary to dehydration.  Urine culture was sent.  She has no acute injury on imaging today.  She was given IV fluids.  Discussed admission for safety and further evaluation of weakness and she is in agreement.  We discussed the patient with hospitalist service who accepts her to the observation unit and she was in stable condition at time of admission.    Diagnosis:    ICD-10-CM    1. Generalized weakness  R53.1    2. Recurrent falls  R29.6        Scribe Disclosure:  Lakeisha THRASHER, am serving as a scribe at 11:44 AM on 12/26/2020 to document services personally performed by Royer Allison MD based on my observations and the provider's statements to me.        Royer Allison MD  12/26/20 2004

## 2020-12-26 NOTE — H&P
History and Physical     Meera Ruvalcaba MRN# [id number]   YOB: 1945 Age: 75 year old      Date of Admission:  12/26/2020    Primary care provider: No Ref-Primary, Physician          Assessment and Plan:   Meera Ruvalcaba is a 75 year old homeless female with a PMH significant for depression and metastatic uterine cancer in remission who presents with weakness and frequent falling.     Patient was discussed with Dr. Gold, who was provider in ED. Chart review of ED work up was reviewed as well as chart review of Care Everywhere, previous visits and admissions.     #Weakness with increased falls  Homeless living in hotel. Niece is POA with paperwork and wants to be called. At baseline patient uses a cane and states she is unsteady on her feet but recently has had increased falls and luckily has been found on the floor by friends.  She denies dizziness prior to these falls.  On admission she is afebrile, tachycardic (sinus tachycardia) and hypertensive (does not take meds). Lab work shows hypokalemia and elevated WBC (appears dehydrated and contracted). CT scan head negative except for possible ventriculomegaly. CXR negative and rapid covid/influenza negative.  -Orthostatics  -LR at 100 ml/hr  -Follow up blood and urine cultures  -PT consult  -Social work consult (likely will need TCU)  -Per ED county has filed vulnerable adult report  -If appropriate referral to neurosurgery for ventriculomegally (falling, no vomiting, urinary incontinence)    #Mild rhabdo   due to prolonged stay on floor (hours)  -LR at 100 ml/hr  -Repeat in AM    #Hx of metastatic uterine cancer  Based on records cancer in 2007 with hysterectomy, cholecystectomy and oophorectomy. No relapses but complains of anorexia, weight loss and lower left abdominal pain.  -CT abdomen/pelvis                  Social: Homelessnes  Code: Discussed with patient and they have chosen DNR/DNI  VTE prophylaxis: Lovenox  Disposition:  Observation                    Chief Complaint:   Weakness and falls         History of Present Illness:   Meera Ruvalcaba is a 75 year old female who presents with 2 recent falls and weakness.  Patient states that she slipped off her bed both times and was unable to get up.  Thankfully she has a couple friends living in her motel that check on her and the first time were able to help her up.  EMS was called the second time and noted urine and feces all over the hotel room so brought her into the emergency room.  She reports being generally weak with poor appetite.  She denies nausea, vomiting and abdominal pain.  She does have chronic diarrhea that seems to be worse without any blood in it.  She is incontinent of urine and stool and does feel like she is urinating more frequently.  She denies respiratory symptoms and chest pain.             Past Medical History:   Depression  Metastatic uterine cancer            Past Surgical History:   Hysterectomy and oopherectomy          Social History:     Social History     Socioeconomic History     Marital status:      Spouse name: Not on file     Number of children: Not on file     Years of education: Not on file     Highest education level: Not on file   Occupational History     Not on file   Social Needs     Financial resource strain: Not on file     Food insecurity     Worry: Not on file     Inability: Not on file     Transportation needs     Medical: Not on file     Non-medical: Not on file   Tobacco Use     Smoking status: Never Smoker     Smokeless tobacco: Never Used   Substance and Sexual Activity     Alcohol use: Never     Frequency: Never     Drug use: Never     Sexual activity: Not Currently   Lifestyle     Physical activity     Days per week: Not on file     Minutes per session: Not on file     Stress: Not on file   Relationships     Social connections     Talks on phone: Not on file     Gets together: Not on file     Attends Cheondoism service: Not on  file     Active member of club or organization: Not on file     Attends meetings of clubs or organizations: Not on file     Relationship status: Not on file     Intimate partner violence     Fear of current or ex partner: Not on file     Emotionally abused: Not on file     Physically abused: Not on file     Forced sexual activity: Not on file   Other Topics Concern     Not on file   Social History Narrative     Not on file               Family History:     Family History   Problem Relation Age of Onset     Cancer Mother         breast and lung     Cancer Father         bone     Prostate Cancer Father      Cancer Maternal Grandfather      Prostate Cancer Maternal Grandfather      Cancer Brother               Allergies:      Allergies   Allergen Reactions     Sulfa Drugs Other (See Comments)     Childhood reaction                Medications:     Prior to Admission medications    Medication Sig Last Dose Taking? Auth Provider   order for DME Equipment being ordered: Depends used 3 per day.  Size small   Robert Khan MD              Review of Systems:   A Comprehensive greater than 10 system review of systems was carried out.  Pertinent positives and negatives are noted above.  Otherwise negative for contributory information.            Physical Exam:   Blood pressure 128/75, pulse 121, temperature 99  F (37.2  C), temperature source Oral, resp. rate 19, weight 50.3 kg (111 lb), SpO2 93 %.  Exam:  GENERAL:  Comfortable.  PSYCH: pleasant, oriented, No acute distress.  HEENT:  PERRLA. Normal conjunctiva, normal hearing, nasal mucosa and Oropharynx are normal.  NECK:  Supple, no neck vein distention, adenopathy or bruits, normal thyroid.  HEART:  Normal S1, S2 with no murmur, no pericardial rub, gallops or S3 or S4.  LUNGS:  Clear to auscultation, normal Respiratory effort. No wheezing, rales or ronchi.  ABDOMEN:  Soft, no hepatosplenomegaly, normal bowel sounds. Tender with palpation of left lower  quadrant.  EXTREMITIES:  No pedal edema, +2 pulses bilateral and equal.  SKIN:  Dry to touch, No rash, wound or ulcerations.  NEUROLOGIC:  CN 2-12 grossly intact, sensation is intact with no focal deficits.               Data:     Recent Labs   Lab 12/26/20  1140   WBC 20.7*   HGB 13.3   HCT 39.7   MCV 91        Recent Labs   Lab 12/26/20  1140      POTASSIUM 3.1*   CHLORIDE 103   CO2 29   ANIONGAP 5   *   BUN 14   CR 0.54   GFRESTIMATED >90   GFRESTBLACK >90   GABY 9.4   PROTTOTAL 7.0   ALBUMIN 3.3*   BILITOTAL 1.1   ALKPHOS 85   AST 27   ALT 16     Recent Labs   Lab 12/26/20  1140   LACT 2.0         Recent Results (from the past 24 hour(s))   CT Head w/o Contrast    Narrative    CT SCAN OF THE HEAD WITHOUT CONTRAST   12/26/2020 12:29 PM     HISTORY: Falls, left forehead hematoma.    TECHNIQUE:  Axial images of the head and coronal reformations without  IV contrast material. Radiation dose for this scan was reduced using  automated exposure control, adjustment of the mA and/or kV according  to patient size, or iterative reconstruction technique.    COMPARISON: None.    FINDINGS: There is no evidence of intracranial hemorrhage, mass, acute  infarct or anomaly. Moderate generalized brain parenchymal volume  loss. Moderate patchy hypoattenuation in the periventricular and deep  cerebral white matter, and to a lesser degree within the subcortical  white matter, nonspecific, but most likely related to chronic  small-vessel ischemic changes. There is mild-to-moderate global  ventriculomegaly, most pronounced involving the lateral and third  ventricles, mildly out of proportion to the degree of global  parenchymal volume loss. The callosal angle is borderline measuring 75  degrees. No extra axial fluid collection or mass effect/herniation.  There is no significant sulcal crowding at the frontoparietal vertex.     Trace scattered paranasal sinus mucosal thickening. Asymmetric  opacification of the right  olfactory cleft is noted (series 7 image 9,  series 8 image 9). The mastoid and middle ear cavities are clear. The  bony calvarium and bones of the skull base appear intact.       Impression    IMPRESSION:  1. No CT findings of acute intracranial abnormality.  2. Moderate generalized brain parenchymal volume loss and presumed  chronic small-vessel ischemic disease.  3. Mild-to-moderate ventriculomegaly, somewhat disproportionate to  degree of generalized brain parenchymal volume loss. This is likely  due to ex vacuo phenomenon in the setting of generalized atrophy and  central white matter volume loss in the setting of small-vessel  ischemic disease. A superimposed component of normal  pressure/communicating hydrocephalus cannot be completely excluded.  4. Asymmetric opacification of the right olfactory cleft, nonspecific.  This could be related to a polyp or focal mucosal thickening, although  the differential would include a neoplasm or less likely an  encephalocele. Recommend correlation with direct inspection.    ELENO CISNEROS MD   XR Shoulder Left G/E 3 Views    Narrative    XR SHOULDER LT G/E 3 VW 12/26/2020 1:00 PM     HISTORY: fall, left shoulder pain    COMPARISON: None.       Impression    IMPRESSION: Bones are demineralized. No acute fracture or dislocation.  Mild degenerative changes. Suspect small bone island within the left  posterior fourth rib.    LIDIA KILPATRICK MD   XR Chest Port 1 View    Narrative    CHEST ONE VIEW PORTABLE  12/26/2020 1:26 PM     HISTORY: Weakness, leukocytosis.    COMPARISON: X-ray on 8/9/2007      Impression    IMPRESSION: Single portable AP view of the chest was obtained.  Cardiomediastinal silhouette is within normal limits. No suspicious  focal pulmonary opacities. Linear lucency along the lateral side of  the right chest, likely represents skinfold, which can be confirmed by  repeating the x-ray with better positioning. No significant left  pneumothorax. No significant  pleural effusion. Degenerative changes of  the bilateral shoulder joints.    MD Lucero GONZALEZ PA-C

## 2020-12-26 NOTE — ED NOTES
Bed: ED18  Expected date: 12/26/20  Expected time: 11:25 AM  Means of arrival: Ambulance  Comments:  Kash 594  75f  Found on floor

## 2020-12-26 NOTE — ED TRIAGE NOTES
75 year old female presents via EMS from hotel room. Patient lowered herself to the floor and could not get back up due to weakness. EMS staff states the patient's room was inhabitable and the patient was covered in urine and feces. Patient's only complaint is generalized weakness. ABCs intact. GCS 15.

## 2020-12-26 NOTE — PHARMACY-ADMISSION MEDICATION HISTORY
Admission medication history interview status for this patient is complete. See [facility] admission navigator for allergy information, prior to admission medications and immunization status.     Medication history interview done via telephone during Covid-19 pandemic, indicate source(s): Patient  Medication history resources (including written lists, pill bottles, clinic record): [employer] fill history (none available), Care Everywhere (none available), chart review  Pharmacy: none currently, [facility]     Changes made to PTA medication list:  Added: none  Deleted: none  Changed: none    Actions taken by pharmacist (provider contacted, etc): none     Additional medication history information: Verified with Meera that she is not taking any medications (no prescriptions or OTC products). Meera said she used to take vitamins C, D and E, but this was 2 years ago - not added to PTA med list. Meera states not taking any medications on as needed basis of any kind. Last chart note from 2019 indicates Meera taking no medications at that time as well.     Medication reconciliation/reorder completed by provider prior to medication history?  N     For patients on insulin therapy: N    Prior to Admission medications    Medication Sig Last Dose Taking? Auth Provider   order for DME Equipment being ordered: Depends used 3 per day.  Size small   Robert Khan MD Melanie Breuer  PGY-1 Pharmacy Practice Resident

## 2020-12-26 NOTE — ED NOTES
Patient incontinent of bowel and bladder. Gave patient bed bath. Performed straight catheterization for urine sample using sterile technique. Purewick applied to patient.

## 2020-12-26 NOTE — PLAN OF CARE
ROOM # 207    Living Situation (if not independent, order SW consult):  Facility name: [facility] [id number] in [place]  : maddison Howe     Activity level at baseline: Ind with cane   Activity level on admit: Ax2, too weak to stand used sliding board       Patient registered to observation; given Patient Bill of Rights; given the opportunity to ask questions about observation status and their plan of care.  Patient has been oriented to the observation room, bathroom and call light is in place.    Discussed discharge goals and expectations with patient/family.

## 2020-12-26 NOTE — ED NOTES
[facility]  ED Nurse Handoff Report    Meera Ruvalcaba is a 75 year old female   ED Chief complaint: Social Work Services  . ED Diagnosis:   Final diagnoses:   Generalized weakness   Recurrent falls     Allergies:   Allergies   Allergen Reactions     Sulfa Drugs Other (See Comments)     Childhood reaction        Code Status: Full Code  Activity level - Baseline/Home:  Independent. Activity Level - Current:   Assist X 1. Lift room needed: No. Bariatric: No   Needed: No   Isolation: No. Infection: Not Applicable.     Vital Signs:   Vitals:    12/26/20 1215 12/26/20 1400 12/26/20 1406 12/26/20 1430   BP:  (!) 142/86 (!) 142/86 128/75   Pulse:    121   Resp: 19      Temp:       TempSrc:       SpO2:   93%    Weight:           Cardiac Rhythm:  ,      Pain level:    Patient confused: No. Patient Falls Risk: Yes.   Elimination Status: Has voided   Patient Report - Initial Complaint: 75 year old female presents via EMS from hotel room. Patient lowered herself to the floor and could not get back up due to weakness. EMS staff states the patient's room was inhabitable and the patient was covered in urine and feces. Patient's only complaint is generalized weakness. ABCs intact. GCS 15.. Focused Assessment: Cognitive - Cognitive/Neuro/Behavioral WDL: all  Level of Consciousness: alert  Arousal Level: opens eyes spontaneously  Orientation: oriented x 4  Follows Commands: yes  Speech: clear; spontaneous; logical  Best Language: 0 - No aphasia  Mood/Behavior: calm; behavior appropriate to situation; cooperative   Cari Coma Scale - Best Eye Response: 4-->(E4) spontaneous  Best Motor Response: 6-->(M6) obeys commands  Best Verbal Response: 5-->(V5) oriented  Cari Coma Scale Score: 15    Tests Performed: ekg, labs, CT, xray. Abnormal Results:   Labs Ordered and Resulted from Time of ED Arrival Up to the Time of Departure from the ED   CBC WITH PLATELETS DIFFERENTIAL - Abnormal; Notable for the following  components:       Result Value    WBC 20.7 (*)     Absolute Neutrophil 19.8 (*)     Absolute Lymphocytes 0.3 (*)     All other components within normal limits   COMPREHENSIVE METABOLIC PANEL - Abnormal; Notable for the following components:    Potassium 3.1 (*)     Glucose 115 (*)     Albumin 3.3 (*)     All other components within normal limits   ROUTINE UA WITH MICROSCOPIC - Abnormal; Notable for the following components:    Ketones Urine 20 (*)     Blood Urine Small (*)     Protein Albumin Urine 50 (*)     WBC Urine 8 (*)     RBC Urine 48 (*)     Bacteria Urine Few (*)     Mucous Urine Present (*)     All other components within normal limits   CK TOTAL - Abnormal; Notable for the following components:    CK Total 628 (*)     All other components within normal limits   LACTIC ACID WHOLE BLOOD   TROPONIN I   INFLUENZA A/B & SARS-COV2 PCR MULTIPLEX   LACTIC ACID WHOLE BLOOD   PERIPHERAL IV CATHETER   BLOOD CULTURE   BLOOD CULTURE     XR Chest Port 1 View   Final Result   IMPRESSION: Single portable AP view of the chest was obtained.   Cardiomediastinal silhouette is within normal limits. No suspicious   focal pulmonary opacities. Linear lucency along the lateral side of   the right chest, likely represents skinfold, which can be confirmed by   repeating the x-ray with better positioning. No significant left   pneumothorax. No significant pleural effusion. Degenerative changes of   the bilateral shoulder joints.      SINDHU RAMÍREZ MD      XR Shoulder Left G/E 3 Views   Final Result   IMPRESSION: Bones are demineralized. No acute fracture or dislocation.   Mild degenerative changes. Suspect small bone island within the left   posterior fourth rib.      LIDIA KILPATRICK MD      CT Head w/o Contrast   Preliminary Result   IMPRESSION:   1. No CT findings of acute intracranial abnormality.   2. Moderate generalized brain parenchymal volume loss and presumed   chronic small-vessel ischemic disease.   3.  Mild-to-moderate ventriculomegaly, somewhat disproportionate to   degree of generalized brain parenchymal volume loss. This is likely   due to ex vacuo phenomenon in the setting of generalized atrophy and   central white matter volume loss in the setting of small-vessel   ischemic disease. A superimposed component of normal   pressure/communicating hydrocephalus cannot be completely excluded.   4. Asymmetric opacification of the right olfactory cleft, nonspecific.   This could be related to a polyp or focal mucosal thickening, although   the differential would include a neoplasm or less likely an   encephalocele. Recommend correlation with direct inspection.        .   Treatments provided: fluids  Family Comments: family friend at bedside is POA  OBS brochure/video discussed/provided to patient:  Yes  ED Medications:   Medications   0.9% sodium chloride BOLUS (1,000 mLs Intravenous New Bag 12/26/20 1200)     Drips infusing:  No  For the majority of the shift, the patient's behavior Green. Interventions performed were NA.    Sepsis treatment initiated: No     Patient tested for COVID 19 prior to admission: YES    ED Nurse Name/Phone Number: Kelsy Henson RN,   2:53 PM    RECEIVING UNIT ED HANDOFF REVIEW    Above ED Nurse Handoff Report was reviewed: Yes  Reviewed by: Genevieve Cerda RN on December 26, 2020 at 4:00 PM

## 2020-12-27 ENCOUNTER — APPOINTMENT (OUTPATIENT)
Dept: PHYSICAL THERAPY | Facility: CLINIC | Age: 75
End: 2020-12-27
Attending: PHYSICIAN ASSISTANT
Payer: COMMERCIAL

## 2020-12-27 LAB
ANION GAP SERPL CALCULATED.3IONS-SCNC: 2 MMOL/L (ref 3–14)
BUN SERPL-MCNC: 16 MG/DL (ref 7–30)
CALCIUM SERPL-MCNC: 8.5 MG/DL (ref 8.5–10.1)
CHLORIDE SERPL-SCNC: 106 MMOL/L (ref 94–109)
CK SERPL-CCNC: 256 U/L (ref 30–225)
CO2 SERPL-SCNC: 30 MMOL/L (ref 20–32)
CREAT SERPL-MCNC: 0.65 MG/DL (ref 0.52–1.04)
ERYTHROCYTE [DISTWIDTH] IN BLOOD BY AUTOMATED COUNT: 13 % (ref 10–15)
GFR SERPL CREATININE-BSD FRML MDRD: 87 ML/MIN/{1.73_M2}
GLUCOSE SERPL-MCNC: 93 MG/DL (ref 70–99)
HCT VFR BLD AUTO: 31 % (ref 35–47)
HGB BLD-MCNC: 10.3 G/DL (ref 11.7–15.7)
INTERPRETATION ECG - MUSE: NORMAL
LACTATE BLD-SCNC: 1.4 MMOL/L (ref 0.7–2)
MCH RBC QN AUTO: 30.9 PG (ref 26.5–33)
MCHC RBC AUTO-ENTMCNC: 33.2 G/DL (ref 31.5–36.5)
MCV RBC AUTO: 93 FL (ref 78–100)
PLATELET # BLD AUTO: 167 10E9/L (ref 150–450)
POTASSIUM SERPL-SCNC: 3.5 MMOL/L (ref 3.4–5.3)
POTASSIUM SERPL-SCNC: 3.7 MMOL/L (ref 3.4–5.3)
RBC # BLD AUTO: 3.33 10E12/L (ref 3.8–5.2)
SODIUM SERPL-SCNC: 138 MMOL/L (ref 133–144)
WBC # BLD AUTO: 15.2 10E9/L (ref 4–11)

## 2020-12-27 PROCEDURE — 96372 THER/PROPH/DIAG INJ SC/IM: CPT | Mod: XS | Performed by: PHYSICIAN ASSISTANT

## 2020-12-27 PROCEDURE — 83605 ASSAY OF LACTIC ACID: CPT | Performed by: INTERNAL MEDICINE

## 2020-12-27 PROCEDURE — 36415 COLL VENOUS BLD VENIPUNCTURE: CPT | Performed by: INTERNAL MEDICINE

## 2020-12-27 PROCEDURE — 36415 COLL VENOUS BLD VENIPUNCTURE: CPT | Performed by: PHYSICIAN ASSISTANT

## 2020-12-27 PROCEDURE — 80048 BASIC METABOLIC PNL TOTAL CA: CPT | Performed by: PHYSICIAN ASSISTANT

## 2020-12-27 PROCEDURE — 258N000003 HC RX IP 258 OP 636: Performed by: PHYSICIAN ASSISTANT

## 2020-12-27 PROCEDURE — 97161 PT EVAL LOW COMPLEX 20 MIN: CPT | Mod: GP | Performed by: PHYSICAL THERAPIST

## 2020-12-27 PROCEDURE — 85049 AUTOMATED PLATELET COUNT: CPT | Performed by: PHYSICIAN ASSISTANT

## 2020-12-27 PROCEDURE — 250N000013 HC RX MED GY IP 250 OP 250 PS 637: Performed by: PHYSICIAN ASSISTANT

## 2020-12-27 PROCEDURE — 82550 ASSAY OF CK (CPK): CPT | Performed by: PHYSICIAN ASSISTANT

## 2020-12-27 PROCEDURE — 96361 HYDRATE IV INFUSION ADD-ON: CPT | Mod: 59

## 2020-12-27 PROCEDURE — 84132 ASSAY OF SERUM POTASSIUM: CPT | Mod: 91 | Performed by: PHYSICIAN ASSISTANT

## 2020-12-27 PROCEDURE — 99225 PR SUBSEQUENT OBSERVATION CARE,LEVEL II: CPT | Performed by: INTERNAL MEDICINE

## 2020-12-27 PROCEDURE — 250N000011 HC RX IP 250 OP 636: Performed by: PHYSICIAN ASSISTANT

## 2020-12-27 PROCEDURE — 85027 COMPLETE CBC AUTOMATED: CPT | Performed by: PHYSICIAN ASSISTANT

## 2020-12-27 PROCEDURE — 97530 THERAPEUTIC ACTIVITIES: CPT | Mod: GP | Performed by: PHYSICAL THERAPIST

## 2020-12-27 PROCEDURE — 97116 GAIT TRAINING THERAPY: CPT | Mod: GP,59 | Performed by: PHYSICAL THERAPIST

## 2020-12-27 PROCEDURE — G0378 HOSPITAL OBSERVATION PER HR: HCPCS

## 2020-12-27 RX ADMIN — SODIUM CHLORIDE, POTASSIUM CHLORIDE, SODIUM LACTATE AND CALCIUM CHLORIDE: 600; 310; 30; 20 INJECTION, SOLUTION INTRAVENOUS at 03:11

## 2020-12-27 RX ADMIN — ACETAMINOPHEN 650 MG: 325 TABLET, FILM COATED ORAL at 16:19

## 2020-12-27 RX ADMIN — ENOXAPARIN SODIUM 40 MG: 40 INJECTION SUBCUTANEOUS at 16:03

## 2020-12-27 NOTE — CONSULTS
SW received a call from patients maddison and VANE Darnell. She said patient has been staying at the [facility] [id number] as she is homeless. She was staying at a friends in [place]. [place] but was basically kicked out with no notice.  Carley has called [facility] to try to get her Aunt on MA and elderly Waiver as she only makes $849.00 a month. She thought possibly there might be an open case with [facility] but she want sure.  [facility] will need to be called on Monday.     Discussed options for TCU. Carley said she lives in [place] and would prefer a place out that way.     Met with patient to discuss need for skilled nursing facility at discharge. The patient was given a list of skilled nursing facilities which includes the medicare.gov website for a comprehensive list of skilled nursing facilities.     Pt/family was given the Medicare Compare list for SNF, with associated star ratings to assist with choice for referrals/discharge planning Yes    Education was given to pt/family that star ratings are updated/maintained by Medicare and can be reviewed by visiting www.medicare.gov Yes    FERNANDA sent referrals to [facility], [facility] [place] [facility], [facility] and [place] [place]..        [place], [facility]  [phone]  [address]   [facility]. [id number]  [facility]

## 2020-12-27 NOTE — PLAN OF CARE
PRIMARY DIAGNOSIS: GENERALIZED WEAKNESS/Frequent Falls    OUTPATIENT/OBSERVATION GOALS TO BE MET BEFORE DISCHARGE  1. Orthostatic performed: No    2. Tolerating PO medications: Yes    3. Return to near baseline physical activity: Yes    4. Cleared for discharge by consultants (if involved): No    Discharge Planner Nurse   Safe discharge environment identified: No  Barriers to discharge: Yes       Entered by: Anabel Borden 12/27/2020 5:19 AM     Please review provider order for any additional goals.   Nurse to notify provider when observation goals have been met and patient is ready for discharge.    Placement (TCU referral) will be the goal today. Pt is AOx4, VSS on RA, denies pain. Pt's legs are slightly pink and warm this morning.   Purewick in place, dark clear wanda output, encouraged PO fluid intake.   K+ replacement returned as 3.5.

## 2020-12-27 NOTE — PROGRESS NOTES
"PRIMARY DIAGNOSIS: GENERALIZED WEAKNESS    OUTPATIENT/OBSERVATION GOALS TO BE MET BEFORE DISCHARGE  1. Orthostatic performed: Yes:          Lying Orthostatic BP: 91/37         Sitting Orthostatic BP: 90/43         Standing Orthostatic BP: 100/40     2. Tolerating PO medications: Yes    3. Return to near baseline physical activity: No    4. Cleared for discharge by consultants (if involved): N/a    Discharge Planner Nurse   Safe discharge environment identified: No  Barriers to discharge: Yes       Entered by: Agustin Mckeon 12/27/2020        /40 (BP Location: Left arm)   Pulse 88   Temp 98.1  F (36.7  C) (Oral)   Resp 16   Ht 1.397 m (4' 7\")   Wt 49.4 kg (108 lb 14.4 oz)   SpO2 99%   BMI 25.31 kg/m      Pt A & 0 x 4.  Pleasant and cooperative.  Denies pain.  Up with A x 1 and walker.  LR infusing at 100/hour.  Purewick in place. PT/OT consults pending and SW following for discharge as pt is currently homeless and has been living in hotel. Continue to monitor and provide supportive cares.     Please review provider order for any additional goals.   Nurse to notify provider when observation goals have been met and patient is ready for discharge.  "

## 2020-12-27 NOTE — PROGRESS NOTES
Vulnerable adult report is being filed by Tutu Patel from [facility].

## 2020-12-27 NOTE — PLAN OF CARE
PRIMARY DIAGNOSIS: Wkns/ Fall    OUTPATIENT/OBSERVATION GOALS TO BE MET BEFORE DISCHARGE  1. Orthostatic performed: No- pt too weak will try again later tonight    2. Tolerating PO medications: Yes    3. Return to near baseline physical activity: No    4. Cleared for discharge by consultants (if involved): No    Discharge Planner Nurse   Safe discharge environment identified: No  Barriers to discharge: Yes       Entered by: Genevieve Cerda 12/26/2020 7:06 PM     Please review provider order for any additional goals.   Nurse to notify provider when observation goals have been met and patient is ready for discharge.    Tachy, max temp 103.2- prn tylenol given, on RA. Ax2 from ED cart using sliding board. A&Ox4. Pt too weak to try orthostatic blood pressures will try again later this evening. Purewick in place, incontinent of both bowel and urine. Denies pain. Denies nausea, little appetite. Plan- orthostatic blood pressures, IVF, follow blood and urine cultures, PT consult, SW consult, possible referral to neurosurgery for ventriculomegaly, repeat labs in am.

## 2020-12-27 NOTE — PROGRESS NOTES
[facility]      OUTPATIENT PHYSICAL THERAPY EVALUATION  PLAN OF TREATMENT FOR OUTPATIENT REHABILITATION  (COMPLETE FOR INITIAL CLAIMS ONLY)  Patient's Last Name, First Name, M.I.  YOB: 1945  Meera Ruvalcaba                        Provider's Name  [facility] Medical Record No.  [id number]                               Onset Date:  12/26/20   Start of Care Date:  12/27/20      Type:     _X_PT   ___OT   ___SLP Medical Diagnosis:  Weakness and falls                        PT Diagnosis:  Impaired functional mobility   Visits from SOC:  1   _________________________________________________________________________________  Plan of Treatment/Functional Goals    Planned Interventions: balance training, bed mobility training, gait training, home exercise program, ROM (range of motion), strengthening, stretching, transfer training     Goals: See Physical Therapy Goals on Care Plan in [employer] electronic health record.    Therapy Frequency: 4x/week  Predicted Duration of Therapy Intervention: 5 days  _________________________________________________________________________________    I CERTIFY THE NEED FOR THESE SERVICES FURNISHED UNDER        THIS PLAN OF TREATMENT AND WHILE UNDER MY CARE     (Physician co-signature of this document indicates review and certification of the therapy plan).                Certification date from: 12/27/20, Certification date to: 01/01/21    Referring Physician: Lucero Aleman PA-C            Initial Assessment        See Physical Therapy evaluation dated 12/27/20 in Epic electronic health record.

## 2020-12-27 NOTE — PLAN OF CARE
"Pt improving  PRIMARY DIAGNOSIS: GENERALIZED WEAKNESS    OUTPATIENT/OBSERVATION GOALS TO BE MET BEFORE DISCHARGE  1. Orthostatic performed: Yes:          Lying Orthostatic BP: 91/37         Sitting Orthostatic BP: 90/43         Standing Orthostatic BP: 100/40     2. Tolerating PO medications: Yes    3. Return to near baseline physical activity: No    4. Cleared for discharge by consultants (if involved): N/A    Discharge Planner Nurse   Safe discharge environment identified: No  Barriers to discharge: Yes       Entered by: Agustin Mckeon 12/27/2020 1:00 PM     /52 (BP Location: Left arm)   Pulse 87   Temp 96.7  F (35.9  C) (Oral)   Resp 16   Ht 1.397 m (4' 7\")   Wt 49.4 kg (108 lb 14.4 oz)   SpO2 99%   BMI 25.31 kg/m      A & 0 x 4.  VSS on RA. Pt A & 0 x 4.  Pleasant and cooperative.  Denies pain.  Up with A x 1 and walker.  LR infusing at 100/hour.  Incontinent of stool x 1 this shift. Purewick in place. PT consult complete and rec TCU. OT consult pending.   SW following for discharge as pt is currently homeless and has been living in hotel, referrals sent to numerous referrals.  Continue to monitor and provide supportive cares.      Please review provider order for any additional goals.   Nurse to notify provider when observation goals have been met and patient is ready for discharge.        "

## 2020-12-27 NOTE — PLAN OF CARE
PRIMARY DIAGNOSIS: Wkns/ Fall    OUTPATIENT/OBSERVATION GOALS TO BE MET BEFORE DISCHARGE  1. Orthostatic performed: Yes:          Lying Orthostatic BP: 91/37         Sitting Orthostatic BP: 90/43         Standing Orthostatic BP: 100/40     2. Tolerating PO medications: Yes    3. Return to near baseline physical activity: No    4. Cleared for discharge by consultants (if involved): No    Discharge Planner Nurse   Safe discharge environment identified: No  Barriers to discharge: Yes       Entered by: Genevieve Cerda 12/26/2020 9:32 PM     Please review provider order for any additional goals.   Nurse to notify provider when observation goals have been met and patient is ready for discharge.    Low grade temp 99.7, on RA. Ax1 with walker and gait belt. A&Ox4. Pt had a hard time with standing bp for orthostatics, very weak. Purewick in place, incontinent of both bowel and urine, has had x2 loose bms. Denies pain. Denies nausea, little appetite. Pts right arm slightly contracted pt said when she fell she was laying on her right arm so her head wouldn't have to be on the floor. Plan- IVF, follow blood and urine cultures, PT consult, SW consult, possible referral to neurosurgery for ventriculomegaly, repeat labs in am, new order placed to replace magnesium and potassium.

## 2020-12-27 NOTE — PROGRESS NOTES
[facility]  Hospitalist Progress Note  Patient Name: Meera Ruvalcaba    MRN: [id number]  Provider: Patricio Rocha MD  12/27/20             Assessment and Plan:      Summary of Stay: Meera Ruvalcaba is a 75 year old female with history of depression and metastatic uterine cancer (in remission).  She is homeless and has been living in a motel.  She presented to the [facility] ED on 12/26/20 for evaluation of a fall.  She had two episodes where she slipped off of her bed in her motel room and was unable to get up. Some friends at the motel were able to help her up. After the second episode, they called EMS. EMS found the motel room in disarray with urine and feces all over the room. She was brought to the ED for evaluation. She reported chronic diarrhea and incontinence of urine and stool. She reported ED work up showed stable vital signs. Laboratory evaluation showed potassium 3.1, albumin 3.3, , lactic acid 2 (with rise to 2.5), and WBC 20.7. UA showed 8 WBC, 48 RBC and small blood but no nitrite or leukocyte esterase. COVID-19 testing was negative.  CT of head showed no acute findings.  It did show moderate generalized brain parenchymal volume loss and presumed chronic small vessel ischemic disease.  Also noted was mild to moderate ventriculomegaly, somewhat disproportionate to degree of generalized brain parenchymal loss.  This was thought to be likely due to ex vacuo phenomenon in the setting of generalized atrophy and central white matter volume loss in the setting of small vessel ischemic disease.  A superimposed component of normal pressure/communicating hydrocephalus could not be completely excluded.  Also noted was asymmetric opacification of the right olfactory cleft, nonspecific.  It was thought that this could be related to polyp or focal mucosal thickening, but differential would include neoplasm or less likely an encephalocele.  Direct correlation with direct inspection was recommended.   "Jyoti was admitted to observation with deconditioning, weakness, fall, mild rhabdomyolysis, and need of additional care placement.    Problem list:     Deconditioning with weakness with increased falls  Falls  Mild rhabdomyolysis  Marginal living situation; adult protection was allegedly been contacted regarding this patient prior to admission  -Potassium has been replaced  -CK has improved with IV fluid hydration  -Physical therapy has seen patient and is recommending transitional care  -Social work has been consulted for placement  -Repeat CK and basic metabolic panel in the morning     Chronic diarrhea  Chronic incontinence of urine and stool  -No evidence of urinary tract infection     Hx of metastatic uterine cancer    CODE STATUS: NR/DNI  VTE prophylaxis: Lovenox  Disposition:  Continue observation care.  To transitional care once bed bound           Interval History:      No new problems.  Feeling pretty good.  Was hungry and ate a big breakfast.                  Physical Exam:      Last Vital Signs:  /52 (BP Location: Left arm)   Pulse 87   Temp 96.7  F (35.9  C) (Oral)   Resp 16   Ht 1.397 m (4' 7\")   Wt 49.4 kg (108 lb 14.4 oz)   SpO2 99%   BMI 25.31 kg/m      Intake/Output Summary (Last 24 hours) at 12/27/2020 1428  Last data filed at 12/27/2020 0654  Gross per 24 hour   Intake 1000 ml   Output 200 ml   Net 800 ml       GENERAL:  Comfortable. Cooperative.  PSYCH: pleasant, oriented, No acute distress.  EYES: PERRLA, Normal conjunctiva.  HEART:  Regular rate and rhythm. No JVD. Pulses normal. No edema.  LUNGS:  Clear to auscultation, normal Respiratory effort.  ABDOMEN:  Soft, no hepatosplenomegaly, normal bowel sounds.  EXTREMETIES: No clubbing, cyanosis or ischemia  SKIN:  Dry to touch, No rash.           Medications:      All current medications were reviewed.         Data:      All new lab and imaging data was reviewed.   Labs:       Lab Results   Component Value Date     12/27/2020 "     12/26/2020     07/08/2019    Lab Results   Component Value Date    CHLORIDE 106 12/27/2020    CHLORIDE 103 12/26/2020    CHLORIDE 107 07/08/2019    Lab Results   Component Value Date    BUN 16 12/27/2020    BUN 14 12/26/2020    BUN 12 07/08/2019      Lab Results   Component Value Date    POTASSIUM 3.7 12/27/2020    POTASSIUM 3.5 12/27/2020    POTASSIUM 2.8 12/26/2020    Lab Results   Component Value Date    CO2 30 12/27/2020    CO2 29 12/26/2020    CO2 28 07/08/2019    Lab Results   Component Value Date    CR 0.65 12/27/2020    CR 0.54 12/26/2020    CR 0.65 07/08/2019        Recent Labs   Lab 12/27/20  0610 12/26/20  1140   WBC 15.2* 20.7*   HGB 10.3* 13.3   HCT 31.0* 39.7   MCV 93 91    225

## 2020-12-27 NOTE — PLAN OF CARE
PRIMARY DIAGNOSIS: GENERALIZED WEAKNESS/Frequent Falls    OUTPATIENT/OBSERVATION GOALS TO BE MET BEFORE DISCHARGE  1. Orthostatic performed: Yes:          Lying Orthostatic BP: 91/37         Sitting Orthostatic BP: 90/43         Standing Orthostatic BP: 100/40     2. Tolerating PO medications: Yes    3. Return to near baseline physical activity: No    4. Cleared for discharge by consultants (if involved): No    Discharge Planner Nurse   Safe discharge environment identified: No  Barriers to discharge: Yes       Entered by: Anabel Borden 12/27/2020 2:52 AM     Please review provider order for any additional goals.   Nurse to notify provider when observation goals have been met and patient is ready for discharge.    Pt is likely going to be referred to TCU; however, pt is currently homeless and social work is following. Vulnerable adult situation.   Sleeping well overnight, up Ax1 walkerSAIDA. Regular diet but poor appetite. VSS on RA. LR infusing @100/hr.

## 2020-12-27 NOTE — PLAN OF CARE
"PRIMARY DIAGNOSIS: GENERALIZED WEAKNESS/ Fall     OUTPATIENT/OBSERVATION GOALS TO BE MET BEFORE DISCHARGE  1. Orthostatic performed: N/a    2. Tolerating PO medications: Yes    3. Return to near baseline physical activity: No    4. Cleared for discharge by consultants (if involved): No    Discharge Planner Nurse   Safe discharge environment identified: No  Barriers to discharge: Yes       Entered by: Jennifer Torres 12/27/2020 1600     Pt is A&Ox4. VSS. Triggered sepsis- Lactic acid drawn, resulted 1.4, fever dropped from 99.5 to 96.7 after Tylenol given. Pleasant and cooperative. Denies acute pain, chest pain, SOB, or cough. Skin is clean, dry, intact w/ blanchable redness noted to BLE w/ trace edema. Tolerating oral medications and regular diet.     /42 (BP Location: Right arm)   Pulse 93   Temp 96.7  F (35.9  C) (Oral)   Resp 16   Ht 1.397 m (4' 7\")   Wt 49.4 kg (108 lb 14.4 oz)   SpO2 97%   BMI 25.31 kg/m      "

## 2020-12-27 NOTE — PROGRESS NOTES
12/27/20 1013   Quick Adds   Quick Adds Certification   Type of Visit Initial PT Evaluation   Living Environment   People in home alone   Current Living Arrangements homeless  (has been staying in a hotel)   Home Accessibility no concerns   Self-Care   Usual Activity Tolerance moderate   Current Activity Tolerance fair   Regular Exercise No   Equipment Currently Used at Home cane, straight   Disability/Function   Walking or Climbing Stairs Difficulty yes   Walking or Climbing Stairs ambulation difficulty, requires equipment   Mobility Management Uses a cane with all mobility   Fall history within last six months yes   Number of times patient has fallen within last six months 3   Change in Functional Status Since Onset of Current Illness/Injury yes   General Information   Onset of Illness/Injury or Date of Surgery 12/26/20   Referring Physician Lucero Aleman PA-C   Patient/Family Therapy Goals Statement (PT) Not stated   Pertinent History of Current Problem (include personal factors and/or comorbidities that impact the POC) Meera Ruvalcaba is a 75 year old homeless female with a PMH significant for depression and metastatic uterine cancer in remission who presents with weakness and frequent falling.    Existing Precautions/Restrictions fall   Weight-Bearing Status - LLE full weight-bearing   Weight-Bearing Status - RLE full weight-bearing   Cognition   Orientation Status (Cognition) oriented x 3   Affect/Mental Status (Cognition) other (see comments)  (Forgetful)   Follows Commands (Cognition) WFL   Safety Deficit (Cognition) insight into deficits/self-awareness   Pain Assessment   Patient Currently in Pain No   Integumentary/Edema   Integumentary/Edema Comments Slight pinkness to B LE, and mild edema noted-pt notes edema at baseline   Posture    Posture Forward head position;Protracted shoulders;Kyphosis   Range of Motion (ROM)   ROM Comment WFL   Strength   Manual Muscle Testing Quick Adds Deficits  observed during functional mobility   Bed Mobility   Comment (Bed Mobility) sit<>supine with SBA   Transfers   Transfer Safety Comments sit<>stand with CGA   Gait/Stairs (Locomotion)   Comment (Gait/Stairs) [place] with FWW   Balance   Balance Comments Requires B UE support for safe dynamic mobility   Sensory Examination   Sensory Perception patient reports no sensory changes   Coordination   Coordination no deficits were identified   Muscle Tone   Muscle Tone no deficits were identified   Clinical Impression   Criteria for Skilled Therapeutic Intervention yes, treatment indicated   PT Diagnosis (PT) Impaired functional mobility   Influenced by the following impairments Weakness, deconditioning, fear of falling   Functional limitations due to impairments Difficulty with bed mobility, transfers, ambulation   Clinical Presentation Stable/Uncomplicated   Clinical Presentation Rationale progressing medically, clear POC   Clinical Decision Making (Complexity) low complexity   Therapy Frequency (PT) 4x/week   Predicted Duration of Therapy Intervention (days/wks) 5 days   Planned Therapy Interventions (PT) balance training;bed mobility training;gait training;home exercise program;ROM (range of motion);strengthening;stretching;transfer training   Risk & Benefits of therapy have been explained evaluation/treatment results reviewed;care plan/treatment goals reviewed;risks/benefits reviewed;current/potential barriers reviewed;participants voiced agreement with care plan;participants included;patient   PT Discharge Planning    PT Discharge Recommendation (DC Rec) Transitional Care Facility   PT Rationale for DC Rec Pt is below baseline level of mobility and demonstrates functional weakness, requiring Ax1 for all mobility. Would benefit from continued PT in the TCU setting to address these deficits.    PT Brief overview of current status  Ax1 WW   Therapy Certification   Start of care date 12/27/20   Certification date from  12/27/20   Certification date to 01/01/21   Medical Diagnosis Weakness and falls   Total Evaluation Time   Total Evaluation Time (Minutes) 5

## 2020-12-28 VITALS
OXYGEN SATURATION: 96 % | HEART RATE: 99 BPM | SYSTOLIC BLOOD PRESSURE: 123 MMHG | BODY MASS INDEX: 25.2 KG/M2 | HEIGHT: 55 IN | RESPIRATION RATE: 21 BRPM | TEMPERATURE: 99.3 F | WEIGHT: 108.9 LBS | DIASTOLIC BLOOD PRESSURE: 47 MMHG

## 2020-12-28 LAB
BACTERIA SPEC CULT: NO GROWTH
CK SERPL-CCNC: 96 U/L (ref 30–225)
ERYTHROCYTE [DISTWIDTH] IN BLOOD BY AUTOMATED COUNT: 12.9 % (ref 10–15)
HCT VFR BLD AUTO: 30.7 % (ref 35–47)
HGB BLD-MCNC: 10.2 G/DL (ref 11.7–15.7)
Lab: NORMAL
MCH RBC QN AUTO: 30.8 PG (ref 26.5–33)
MCHC RBC AUTO-ENTMCNC: 33.2 G/DL (ref 31.5–36.5)
MCV RBC AUTO: 93 FL (ref 78–100)
PLATELET # BLD AUTO: 171 10E9/L (ref 150–450)
RBC # BLD AUTO: 3.31 10E12/L (ref 3.8–5.2)
SPECIMEN SOURCE: NORMAL
WBC # BLD AUTO: 10 10E9/L (ref 4–11)

## 2020-12-28 PROCEDURE — 85027 COMPLETE CBC AUTOMATED: CPT | Performed by: INTERNAL MEDICINE

## 2020-12-28 PROCEDURE — 82550 ASSAY OF CK (CPK): CPT | Performed by: INTERNAL MEDICINE

## 2020-12-28 PROCEDURE — 36415 COLL VENOUS BLD VENIPUNCTURE: CPT | Performed by: INTERNAL MEDICINE

## 2020-12-28 PROCEDURE — G0378 HOSPITAL OBSERVATION PER HR: HCPCS

## 2020-12-28 PROCEDURE — 250N000013 HC RX MED GY IP 250 OP 250 PS 637: Mod: GY | Performed by: PHYSICIAN ASSISTANT

## 2020-12-28 PROCEDURE — 99217 PR OBSERVATION CARE DISCHARGE: CPT | Performed by: INTERNAL MEDICINE

## 2020-12-28 RX ORDER — POLYETHYLENE GLYCOL 3350 17 G/17G
17 POWDER, FOR SOLUTION ORAL DAILY
Qty: 510 G | DISCHARGE
Start: 2020-12-28 | End: 2022-10-03

## 2020-12-28 RX ORDER — ACETAMINOPHEN 325 MG/1
650 TABLET ORAL EVERY 6 HOURS PRN
DISCHARGE
Start: 2020-12-28 | End: 2022-10-03

## 2020-12-28 RX ADMIN — ACETAMINOPHEN 650 MG: 325 TABLET, FILM COATED ORAL at 11:49

## 2020-12-28 NOTE — PLAN OF CARE
Patient's After Visit Summary was reviewed with patient.   Patient verbalized understanding of After Visit Summary, recommended follow up and was given an opportunity to ask questions.   Discharge medications sent home with patient/family: No   Discharged with other: HE WC transport to [facility]    OBSERVATION patient END time: 8795

## 2020-12-28 NOTE — PLAN OF CARE
"PRIMARY DIAGNOSIS: GENERALIZED WEAKNESS/FALL    OUTPATIENT/OBSERVATION GOALS TO BE MET BEFORE DISCHARGE  1. Orthostatic performed: N/A    2. Tolerating PO medications: Yes    3. Return to near baseline physical activity: No    4. Cleared for discharge by consultants (if involved): Yes    Discharge Planner Nurse   Safe discharge environment identified: No  Barriers to discharge: Yes       Entered by: Dang Junior 12/28/2020      Vitals: /48 (BP Location: Right arm)   Pulse 91   Temp 97.5  F (36.4  C) (Oral)   Resp 16   Ht 1.397 m (4' 7\")   Wt 49.4 kg (108 lb 14.4 oz)   SpO2 95%   BMI 25.31 kg/m    BMI= Body mass index is 25.31 kg/m .    Pt AOx4.  Pt complained of body aches this am. Offered Pt some medication therapy, pt declined because she does not like to take medications.  Educated pt the importance of medication for pain control.  Ax1.  No IV access - providers aware.  Purewick in place. Incontinent of bowel & bladder. Reg diet - pt consumed 50% of meal this am.  Pt reports pain of legs when touched, BLE redness warm to touch.  PT recommends TCU.  SW following for placement.        Please review provider order for any additional goals.   Nurse to notify provider when observation goals have been met and patient is ready for discharge.  "

## 2020-12-28 NOTE — PLAN OF CARE
Pt A&O x 4, uses call light at times, on RA, vitals stable, using external female catheter and is also incontinent of urine. Bilat lower extremities noted to be red, hot, and tender to the touch, relieved with elevation on pillow. Pt able to reposition self from side to side with minimal help from staff, needs assistance boosting and getting in and out of bed. Educated on skin and fall risk, call light within reach, bed alarm on.       Problem: Adult Inpatient Plan of Care  Goal: Plan of Care Review  Outcome: No Change  Goal: Patient-Specific Goal (Individualized)  Outcome: No Change  Goal: Absence of Hospital-Acquired Illness or Injury  Outcome: No Change  Intervention: Identify and Manage Fall Risk  Recent Flowsheet Documentation  Taken 12/28/2020 0400 by Kaylene Salomon RN  Safety Promotion/Fall Prevention:   assistive device/personal items within reach   bed alarm on   fall prevention program maintained   lighting adjusted   clutter free environment maintained   nonskid shoes/slippers when out of bed   treat underlying cause   safety round/check completed   room organization consistent  Intervention: Prevent Skin Injury  Recent Flowsheet Documentation  Taken 12/28/2020 0400 by Kaylene Salomon RN  Body Position:   supine, legs elevated   weight shifting   position changed independently   position maintained  Intervention: Prevent and Manage VTE (Venous Thromboembolism) Risk  Recent Flowsheet Documentation  Taken 12/28/2020 0400 by Kaylene Salomon RN  VTE Prevention/Management:   dorsiflexion/plantar flexion performed   fluids promoted  Goal: Optimal Comfort and Wellbeing  Outcome: No Change  Goal: Readiness for Transition of Care  Outcome: No Change

## 2020-12-28 NOTE — PROGRESS NOTES
Care Management Discharge Note    Discharge Disposition:  [facility], today.    Discharge Transportation:  Reviewed out of pocket cost for  [email or web address] transport, $75 for base rate and $5 per mile to the destination. Pt/family expressed understanding and are agreeable to this.     Private pay costs discussed: transportation costs.    PAS Confirmation Code:  Being completed.  Patient/family educated on Medicare website which has current facility and service quality ratings:  Yes.    Education Provided on the Discharge Plan:  Yes.  Persons Notified of Discharge Plans: Patient, TCU admissions, VANE Howe.   Patient/Family in Agreement with the Plan:  Yes    Additional Information:  PAS being completed. HE WC transport arranged for 1315 today. Zara reports that an MA beny was submitted 12/17, and requested that SW at TCU assist with LTC placement and follow up on MA beny. Notified admissions who will update TCU SW.     FAISAL Aguilar

## 2020-12-28 NOTE — PLAN OF CARE
"PRIMARY DIAGNOSIS: GENERALIZED WEAKNESS/FALL    OUTPATIENT/OBSERVATION GOALS TO BE MET BEFORE DISCHARGE  1. Orthostatic performed: N/A    2. Tolerating PO medications: Yes    3. Return to near baseline physical activity: No    4. Cleared for discharge by consultants (if involved): Yes    Discharge Planner Nurse   Safe discharge environment identified: No  Barriers to discharge: Yes       Entered by: Dang Junior 12/28/2020      Vitals: /47 (BP Location: Right arm)   Pulse 99   Temp 99.3  F (37.4  C) (Oral)   Resp 21   Ht 1.397 m (4' 7\")   Wt 49.4 kg (108 lb 14.4 oz)   SpO2 96%   BMI 25.31 kg/m    BMI= Body mass index is 25.31 kg/m .      Pt AOx4. Ax1.  Slight temp, gave Pt PRN tylenol.  No IV access - providers aware.  Purewick in place. Incontinent of bowel & bladder. Tolerating Reg diet & PO meds. Pt to discharge to [facility] TCU via [facility] transport at 1315 today.  Will cont to monitor & provide cares.      Please review provider order for any additional goals.   Nurse to notify provider when observation goals have been met and patient is ready for discharge.  "

## 2020-12-28 NOTE — PLAN OF CARE
"PRIMARY DIAGNOSIS: GENERALIZED WEAKNESS/ FALL    OUTPATIENT/OBSERVATION GOALS TO BE MET BEFORE DISCHARGE  1. Orthostatic performed: No    2. Tolerating PO medications: Yes    3. Return to near baseline physical activity: No    4. Cleared for discharge by consultants (if involved): No    Discharge Planner Nurse   Safe discharge environment identified: No  Barriers to discharge: Yes       Entered by: Jennifer Torres 12/27/2020 2000     Pt is A&Ox4. VSS. Denies acute pain, chest pain, SOB, or cough. Skin is clean, dry, intact w/ blanchable redness noted to BLE w/ trace edema. Large BM this evening reported per NST. Tolerating oral medications and regular diet. Cooperative and pleasant with all cares. Bed alarm on and activated.     /47 (BP Location: Right arm)   Pulse 91   Temp 97.3  F (36.3  C) (Oral)   Resp 16   Ht 1.397 m (4' 7\")   Wt 49.4 kg (108 lb 14.4 oz)   SpO2 95%   BMI 25.31 kg/m      "

## 2020-12-28 NOTE — PLAN OF CARE
OT: Orders received, chart reviewed, pt accepted for TCU admission today. Will defer OT eval to next level of care to optimize participation in evaluation. Will complete OT orders.

## 2020-12-28 NOTE — PLAN OF CARE
Physical Therapy Discharge Summary    Reason for therapy discharge:    Discharged to transitional care facility.    Progress towards therapy goal(s). See goals on Care Plan in [facility] electronic health record for goal details.  Goals not met.  Barriers to achieving goals:   discharge from facility.    Therapy recommendation(s):    Continued therapy is recommended.  Rationale/Recommendations:  PT as indicated at TCU.      Note: Pt not seen by documenting PT on this date. Information obtained from chart review.

## 2020-12-28 NOTE — DISCHARGE SUMMARY
"Discharge Summary    Meera Ruvalcaba MRN# [id number]   YOB: 1945 Age: 75 year old     Date of Admission:  12/26/2020  Date of Discharge:  12/28/2020  Admitting Physician:  Patricio Rocha MD  Discharge Physician:  Patricio Rocha MD  Discharging Service:  Hospitalist     Home clinic: To be established          Discharge Diagnosis:   *Deconditioning with weakness and falls  *Mild rhabdomyolysis  *Unsafe living situation; adult protection was allegedly contacted regarding this patient prior to admission  *Chronic diarrhea  *Chronic incontinence of urine and stool  *Hx of metastatic uterine cancer  *Ventriculomegaly on CT of head; low suspicion of normal pressure hydrocephalus with no ataxia or acute cognitive impairment  *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 rate 16, height 1.397 m (4' 7\"), weight 49.4 kg (108 lb 14.4 oz), SpO2 95 %.   Code status on discharge: Full Code   Physical exam on day of discharge:   GENERAL:  Comfortable. Cooperative.  PSYCH: pleasant, oriented, No acute distress.  EYES: PERRLA, Normal conjunctiva.  HEART:  Regular rate and rhythm. No JVD. Pulses normal. No edema.  LUNGS:  Clear to auscultation, normal Respiratory effort.  ABDOMEN:  Soft, no hepatosplenomegaly, normal bowel sounds.  EXTREMETIES: No clubbing, cyanosis or ischemia  SKIN:  Dry to touch, No rash.         History of Present Illness and Hospital Course:     See detailed admission note for full details.  Meera Ruvalcaba is a 75 year old female with history of depression and metastatic uterine cancer " (in remission).  She is homeless and has been living in a motel.  She presented to the [facility] ED on 12/26/20 for evaluation after some falls.  She had two episodes where she slipped off of her bed in her motel room and was unable to get up. Some friends at the motel were able to help her up. After the second episode, they called EMS. EMS found the motel room in disarray with urine and feces all over the room. She was brought to the ED for evaluation. She reported chronic diarrhea and incontinence of urine and stool.  ED work up showed stable vital signs. Laboratory evaluation showed potassium 3.1, albumin 3.3, , lactic acid 2 (with rise to 2.5), and WBC 20.7. UA showed 8 WBC, 48 RBC and small blood but no nitrite or leukocyte esterase. UC was obtained and ultimately showed no growth. COVID-19 testing was negative.  CT of head showed no acute findings.  It did show moderate generalized brain parenchymal volume loss and presumed chronic small vessel ischemic disease.  Also noted was mild to moderate ventriculomegaly, somewhat disproportionate to degree of generalized brain parenchymal loss.  This was thought to be likely due to ex vacuo phenomenon in the setting of generalized atrophy and central white matter volume loss in the setting of small vessel ischemic disease.  A superimposed component of normal pressure/communicating hydrocephalus could not be completely excluded.  Also noted was asymmetric opacification of the right olfactory cleft, nonspecific.  It was thought that this could be related to polyp or focal mucosal thickening, but differential would include neoplasm or less likely an encephalocele.  Direct correlation with direct inspection was recommended.  Jyoti was admitted to observation with deconditioning, weakness, fall, and mild rhabdomyolysis. She was seen by PT and TCU was recommended. Social work saw patient and arranged TCU placement.              Procedures / Imaging:   CT of head            Consultations:   Consultation during this admission received from physical therapy and social work             Pending Results:   None           Discharge Instructions and Follow-Up:   Discharge diet: Regular   Discharge activity: Activity as tolerated   Discharge follow-up: Follow up with nursing home physician  Follow up with ENT to evaluate suspected right olfactory cleft polyp   Outpatient therapy: PT and OT    Other instructions: None             Discharge Medications:   Current Discharge Medication List      START taking these medications    Details   acetaminophen (TYLENOL) 325 MG tablet Take 2 tablets (650 mg) by mouth every 6 hours as needed for pain    Associated Diagnoses: Pain      melatonin 1 MG TABS tablet Take 1 tablet (1 mg) by mouth nightly as needed for sleep  Qty:      Associated Diagnoses: Insomnia, unspecified type      polyethylene glycol (MIRALAX) 17 GM/Dose powder Take 17 g by mouth daily  Qty: 510 g    Associated Diagnoses: Constipation, unspecified constipation type         CONTINUE these medications which have NOT CHANGED    Details   order for DME Equipment being ordered: Depends used 3 per day.  Size small  Qty: 7 Box, Refills: 11    Associated Diagnoses: Continuous leakage of urine                Total time spent in face to face contact with the patient and coordinating discharge was:  25   Minutes

## 2020-12-28 NOTE — PROGRESS NOTES
Your information has been submitted on December 28th, 2020 at 12:13:04 PM CST. The confirmation number is [id number]    Rocio Pruitt  Care Management Coordinator  [facility]  [phone]

## 2021-01-01 LAB
BACTERIA SPEC CULT: NO GROWTH
BACTERIA SPEC CULT: NO GROWTH
Lab: NORMAL
Lab: NORMAL
SPECIMEN SOURCE: NORMAL
SPECIMEN SOURCE: NORMAL

## 2021-01-19 ENCOUNTER — RECORDS - HEALTHEAST (OUTPATIENT)
Dept: LAB | Facility: CLINIC | Age: 76
End: 2021-01-19

## 2021-01-21 LAB
ERYTHROCYTE [DISTWIDTH] IN BLOOD BY AUTOMATED COUNT: 13.2 % (ref 11–14.5)
FERRITIN SERPL-MCNC: 127 NG/ML (ref 10–130)
FOLATE SERPL-MCNC: 6.5 NG/ML
HCT VFR BLD AUTO: 37.2 % (ref 35–47)
HGB BLD-MCNC: 11.7 G/DL (ref 12–16)
IRON SATN MFR SERPL: 10 % (ref 20–50)
IRON SERPL-MCNC: 22 UG/DL (ref 42–175)
IRON SERPL-MCNC: 25 UG/DL (ref 42–175)
MCH RBC QN AUTO: 29.5 PG (ref 27–34)
MCHC RBC AUTO-ENTMCNC: 31.5 G/DL (ref 32–36)
MCV RBC AUTO: 94 FL (ref 80–100)
PLATELET # BLD AUTO: 300 THOU/UL (ref 140–440)
PMV BLD AUTO: 10.6 FL (ref 8.5–12.5)
RBC # BLD AUTO: 3.97 MILL/UL (ref 3.8–5.4)
TIBC SERPL-MCNC: 260 UG/DL (ref 313–563)
TRANSFERRIN SERPL-MCNC: 208 MG/DL (ref 212–360)
VIT B12 SERPL-MCNC: 287 PG/ML (ref 213–816)
WBC: 10.6 THOU/UL (ref 4–11)

## 2021-01-29 ENCOUNTER — RECORDS - HEALTHEAST (OUTPATIENT)
Dept: LAB | Facility: CLINIC | Age: 76
End: 2021-01-29

## 2021-02-01 LAB — SODIUM SERPL-SCNC: 142 MMOL/L (ref 136–145)

## 2022-02-24 ENCOUNTER — DOCUMENTATION ONLY (OUTPATIENT)
Dept: OTHER | Facility: CLINIC | Age: 77
End: 2022-02-24
Payer: MEDICARE

## 2022-04-20 ENCOUNTER — LAB REQUISITION (OUTPATIENT)
Dept: LAB | Facility: CLINIC | Age: 77
End: 2022-04-20
Payer: COMMERCIAL

## 2022-04-20 DIAGNOSIS — D50.8 OTHER IRON DEFICIENCY ANEMIAS: ICD-10-CM

## 2022-04-22 LAB
ANION GAP SERPL CALCULATED.3IONS-SCNC: 10 MMOL/L (ref 5–18)
BUN SERPL-MCNC: 13 MG/DL (ref 8–28)
CALCIUM SERPL-MCNC: 9.7 MG/DL (ref 8.5–10.5)
CHLORIDE BLD-SCNC: 105 MMOL/L (ref 98–107)
CO2 SERPL-SCNC: 24 MMOL/L (ref 22–31)
CREAT SERPL-MCNC: 0.82 MG/DL (ref 0.6–1.1)
ERYTHROCYTE [DISTWIDTH] IN BLOOD BY AUTOMATED COUNT: 12.7 % (ref 10–15)
GFR SERPL CREATININE-BSD FRML MDRD: 74 ML/MIN/1.73M2
GLUCOSE BLD-MCNC: 127 MG/DL (ref 70–125)
HCT VFR BLD AUTO: 39.7 % (ref 35–47)
HGB BLD-MCNC: 13 G/DL (ref 11.7–15.7)
MCH RBC QN AUTO: 31.3 PG (ref 26.5–33)
MCHC RBC AUTO-ENTMCNC: 32.7 G/DL (ref 31.5–36.5)
MCV RBC AUTO: 96 FL (ref 78–100)
PLATELET # BLD AUTO: 354 10E3/UL (ref 150–450)
POTASSIUM BLD-SCNC: 3.8 MMOL/L (ref 3.5–5)
RBC # BLD AUTO: 4.15 10E6/UL (ref 3.8–5.2)
SODIUM SERPL-SCNC: 139 MMOL/L (ref 136–145)
WBC # BLD AUTO: 5.8 10E3/UL (ref 4–11)

## 2022-04-22 PROCEDURE — 36415 COLL VENOUS BLD VENIPUNCTURE: CPT | Mod: ORL

## 2022-04-22 PROCEDURE — 80048 BASIC METABOLIC PNL TOTAL CA: CPT | Mod: ORL

## 2022-04-22 PROCEDURE — P9604 ONE-WAY ALLOW PRORATED TRIP: HCPCS | Mod: ORL

## 2022-04-22 PROCEDURE — 85027 COMPLETE CBC AUTOMATED: CPT | Mod: ORL

## 2022-10-03 ENCOUNTER — ASSISTED LIVING VISIT (OUTPATIENT)
Dept: GERIATRICS | Facility: CLINIC | Age: 77
End: 2022-10-03
Payer: COMMERCIAL

## 2022-10-03 VITALS
WEIGHT: 108.7 LBS | HEIGHT: 55 IN | SYSTOLIC BLOOD PRESSURE: 153 MMHG | RESPIRATION RATE: 16 BRPM | TEMPERATURE: 95.9 F | DIASTOLIC BLOOD PRESSURE: 68 MMHG | HEART RATE: 93 BPM | BODY MASS INDEX: 25.16 KG/M2

## 2022-10-03 DIAGNOSIS — G47.00 INSOMNIA, UNSPECIFIED TYPE: ICD-10-CM

## 2022-10-03 DIAGNOSIS — R93.2 ABNORMAL FINDING ON IMAGING OF LIVER: ICD-10-CM

## 2022-10-03 DIAGNOSIS — K59.00 CONSTIPATION, UNSPECIFIED CONSTIPATION TYPE: ICD-10-CM

## 2022-10-03 DIAGNOSIS — F33.9 RECURRENT MAJOR DEPRESSIVE DISORDER, REMISSION STATUS UNSPECIFIED (H): ICD-10-CM

## 2022-10-03 DIAGNOSIS — R29.6 RECURRENT FALLS: Primary | ICD-10-CM

## 2022-10-03 DIAGNOSIS — I89.0 LYMPHEDEMA: ICD-10-CM

## 2022-10-03 PROBLEM — K56.609 SMALL BOWEL OBSTRUCTION (H): Status: ACTIVE | Noted: 2022-01-13

## 2022-10-03 PROBLEM — K76.9 LIVER LESION: Status: ACTIVE | Noted: 2022-01-13

## 2022-10-03 RX ORDER — VITAMIN B COMPLEX
1 TABLET ORAL DAILY
COMMUNITY

## 2022-10-03 RX ORDER — ACETAMINOPHEN 500 MG
1000 TABLET ORAL EVERY 8 HOURS PRN
COMMUNITY

## 2022-10-03 RX ORDER — SENNOSIDES A AND B 8.6 MG/1
2 TABLET, FILM COATED ORAL DAILY
COMMUNITY

## 2022-10-03 RX ORDER — LANOLIN ALCOHOL/MO/W.PET/CERES
1 CREAM (GRAM) TOPICAL AT BEDTIME
COMMUNITY

## 2022-10-03 NOTE — PROGRESS NOTES
[facility]  PRIMARY CARE PROVIDER AND CLINIC:  Shelbi Guzman, APRN CNP, [address] / [place] [address]  Chief Complaint   Patient presents with     [facility] Medical Record Number:  [id number]  Place of Service where encounter took place:  [facility] [[id number]]    Meera Ruvalcaba  is a 76 year old  (1945), living in above facility since 3/1/22 and now choosing to change PCPs to [facility].    HPI:    This is a 76-year-old female, with a past medical history significant for a small bowel obstruction 1/12/22 secondary to adhesions managed conservatively, liver lesion on abdominal CT 1/13/22, depression, recurrent falls, cystocele, GERD, endometrial cancer s/p VIJI/BSO aortic lymphadenectomy and bilateral pelvic lymphadenectomy 2007, and lymphedema of bilateral lower extremities, who moved to [facility] on 2/28/22. She has now elected to be followed by [facility].     Today, patient is seen sitting in the main dining room between meal times. Has lived in [place] most of her life. Lived in [place], [place] for three years because she was  to a . Lived in  housing and had to deal with large cockroaches. Gave birth to a stillborn, eight and a half month old daughter. Doctors recommended she not look after her daughter after she delivered. Was the most difficult experience of her life. Worked as a . 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 The restaurant caused her father to have a nervous breakdown, but she enjoyed owing it. After the restaurant closed, went to work across the street.  was a . 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 Loves cats and takes care of them when she can. Has a brother. Parents passed away from cancer. Is grateful for the services hospice provided when her parents passed away.    Would  describe herself as overall healthy. States her legs are so weak and this is why she's in an assisted living. Uses a walker to get around. Has swelling in her legs secondary to radiation treatment for her cancer. Refused chemotherapy treatment. Had frequent gallbladder attacks which then led to her ovaries and uterus being removed as well as her lymph nodes. Has had edema every since her lymph nodes were removed. Requires assistance from staff for laundry and showers. Also gets help with her black compression stockings.    Had a fall over the weekend. Laid on the floor for an hour and twenty minutes before staff was able to assist her up. Had no injuries. Has no pain or shortness of breath    CODE STATUS/ADVANCE DIRECTIVES DISCUSSION:  No CPR- Do NOT Intubate  DNR / DNI  ALLERGIES:   Allergies   Allergen Reactions     Sulfa Drugs Other (See Comments)     Childhood reaction       PAST MEDICAL HISTORY:   Past Medical History:   Diagnosis Date     Ascites 7/27/2007    ICD 10     Cystocele 7/27/2007     Generalized weakness 12/26/2020     GERD (gastroesophageal reflux disease) 7/20/2007     Recurrent falls 12/26/2020      PAST SURGICAL HISTORY:   has no past surgical history on file.  FAMILY HISTORY: family history includes Cancer in her brother, father, maternal grandfather, and mother; Prostate Cancer in her father and maternal grandfather.  SOCIAL HISTORY:   reports that she has never smoked. She has never used smokeless tobacco. She reports that she does not drink alcohol and does not use drugs.  Patient's living condition: lives in an assisted living facility    Post Discharge Medication Reconciliation Status:   Post Medication Reconciliation Status:      Current Outpatient Medications   Medication Sig     acetaminophen (TYLENOL) 500 MG tablet Take 1,000 mg by mouth every 8 hours as needed for mild pain     FLUoxetine (PROZAC) 20 MG capsule Take 20 mg by mouth 2 times daily     magnesium hydroxide (MILK OF  "MAGNESIA) 400 MG/5ML suspension Take 5 mLs by mouth daily as needed for constipation or heartburn     melatonin 3 MG tablet Take 1 mg by mouth At Bedtime     order for DME Equipment being ordered: Depends used 3 per day.  Size small     senna (SENOKOT) 8.6 MG tablet Take 2 tablets by mouth daily     Vitamin D3 (CHOLECALCIFEROL) 25 mcg (1000 units) tablet Take 1 tablet by mouth daily     acetaminophen (TYLENOL) 325 MG tablet Take 2 tablets (650 mg) by mouth every 6 hours as needed for pain     melatonin 1 MG TABS tablet Take 1 tablet (1 mg) by mouth nightly as needed for sleep     polyethylene glycol (MIRALAX) 17 GM/Dose powder Take 17 g by mouth daily     No current facility-administered medications for this visit.     ROS:  4 point ROS including Respiratory, CV, GI and , other than that noted in the HPI,  is negative    Vitals:  BP (!) 153/68   Pulse 93   Temp (!) 95.9  F (35.5  C)   Resp 16   Ht 1.397 m (4' 7\")   Wt 49.3 kg (108 lb 11.2 oz)   BMI 25.26 kg/m    Exam:  GENERAL APPEARANCE:  Alert, in no distress  ENT:  Mouth and posterior oropharynx normal, moist mucous membranes  EYES:  EOM, conjunctivae, lids, pupils and irises normal  RESP:  respiratory effort and palpation of chest normal, lungs clear to auscultation , no respiratory distress  CV:  Palpation and auscultation of heart done , regular rate and rhythm, no murmur, rub, or gallop  ABDOMEN:  normal bowel sounds, soft, nontender, no hepatosplenomegaly or other masses  M/S:   Lymphedema in BLE  SKIN:  Inspection of skin and subcutaneous tissue baseline, Palpation of skin and subcutaneous tissue baseline  NEURO:   Cranial nerves 2-12 are normal tested and grossly at patient's baseline  PSYCH:  affect and mood normal    Lab/Diagnostic data:  Labs done in SNF are in [place] EPIC. Please refer to them using [employer]Care Everywhere.    ASSESSMENT/PLAN:  Recurrent Falls. With recent fall over the weekend. Uses walker for mobility. Has been on home therapy " "in the past. Staff to provide assistance as indicated.    Lymphedema. With history of aortic lymphadenectomy and bilateral pelvic lymphadenectomy. Continue compression stockings as ordered.     Depression. Continue Fluoxetine as ordered.    Insomnia. Continue Melatonin as ordered.    Constipation with History of Small Bowel Obstruction in January 2022. Continue Senna as ordered.    Abnormal Imaging. Noted on 1/13/22 abdominal CT \"1.2 cm hypodensity in the right hepatic lobe\". Recommended liver MRI within 3 months. Will need to follow-up with patient to determine if this has been followed up on.    Orders:  None    Electronically signed by:  MATTIE Martinez CNP                 "

## 2022-10-03 NOTE — LETTER
10/3/2022        RE: Meera Ruvalcaba  [address] [facility] [id number]  [facility] [address]        [facility] GERIATRICS  PRIMARY CARE PROVIDER AND CLINIC:  MATTIE Keith CNP, [address] [facility] / [place] [address]  Chief Complaint   Patient presents with     [facility] Medical Record Number:  [id number]  Place of Service where encounter took place:  [facility] ([facility]) [[id number]]    Meera Ruvalcaba  is a 76 year old  (1945), living in above facility since 3/1/22 and now choosing to change PCPs to [facility].    HPI:    This is a 76-year-old female, with a past medical history significant for a small bowel obstruction 1/12/22 secondary to adhesions managed conservatively, liver lesion on abdominal CT 1/13/22, depression, recurrent falls, cystocele, GERD, endometrial cancer s/p VIJI/BSO aortic lymphadenectomy and bilateral pelvic lymphadenectomy 2007, and lymphedema of bilateral lower extremities, who moved to [facility] on 2/28/22. She has now elected to be followed by [facility].     Today, patient is seen sitting in the main dining room between meal times. Has lived in [place] most of her life. Lived in [place], [place] for three years because she was  to a . Lived in  housing and had to deal with large cockroaches. Gave birth to a stillborn, eight and a half month old daughter. Doctors recommended she not look after her daughter after she delivered. Was the most difficult experience of her life. Worked as a . 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 The restaurant caused her father to have a nervous breakdown, but she enjoyed owing it. After the restaurant closed, went to work across the street.  was a . 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 Loves cats and takes care of them when she can. Has a brother. Parents passed  away from cancer. Is grateful for the services hospice provided when her parents passed away.    Would describe herself as overall healthy. States her legs are so weak and this is why she's in an assisted living. Uses a walker to get around. Has swelling in her legs secondary to radiation treatment for her cancer. Refused chemotherapy treatment. Had frequent gallbladder attacks which then led to her ovaries and uterus being removed as well as her lymph nodes. Has had edema every since her lymph nodes were removed. Requires assistance from staff for laundry and showers. Also gets help with her black compression stockings.    Had a fall over the weekend. Laid on the floor for an hour and twenty minutes before staff was able to assist her up. Had no injuries. Has no pain or shortness of breath    CODE STATUS/ADVANCE DIRECTIVES DISCUSSION:  No CPR- Do NOT Intubate  DNR / DNI  ALLERGIES:   Allergies   Allergen Reactions     Sulfa Drugs Other (See Comments)     Childhood reaction       PAST MEDICAL HISTORY:   Past Medical History:   Diagnosis Date     Ascites 7/27/2007    ICD 10     Cystocele 7/27/2007     Generalized weakness 12/26/2020     GERD (gastroesophageal reflux disease) 7/20/2007     Recurrent falls 12/26/2020      PAST SURGICAL HISTORY:   has no past surgical history on file.  FAMILY HISTORY: family history includes Cancer in her brother, father, maternal grandfather, and mother; Prostate Cancer in her father and maternal grandfather.  SOCIAL HISTORY:   reports that she has never smoked. She has never used smokeless tobacco. She reports that she does not drink alcohol and does not use drugs.  Patient's living condition: lives in an assisted living facility    Post Discharge Medication Reconciliation Status:   Post Medication Reconciliation Status:      Current Outpatient Medications   Medication Sig     acetaminophen (TYLENOL) 500 MG tablet Take 1,000 mg by mouth every 8 hours as needed for mild pain      "FLUoxetine (PROZAC) 20 MG capsule Take 20 mg by mouth 2 times daily     magnesium hydroxide (MILK OF MAGNESIA) 400 MG/5ML suspension Take 5 mLs by mouth daily as needed for constipation or heartburn     melatonin 3 MG tablet Take 1 mg by mouth At Bedtime     order for DME Equipment being ordered: Depends used 3 per day.  Size small     senna (SENOKOT) 8.6 MG tablet Take 2 tablets by mouth daily     Vitamin D3 (CHOLECALCIFEROL) 25 mcg (1000 units) tablet Take 1 tablet by mouth daily     acetaminophen (TYLENOL) 325 MG tablet Take 2 tablets (650 mg) by mouth every 6 hours as needed for pain     melatonin 1 MG TABS tablet Take 1 tablet (1 mg) by mouth nightly as needed for sleep     polyethylene glycol (MIRALAX) 17 GM/Dose powder Take 17 g by mouth daily     No current facility-administered medications for this visit.     ROS:  4 point ROS including Respiratory, CV, GI and , other than that noted in the HPI,  is negative    Vitals:  BP (!) 153/68   Pulse 93   Temp (!) 95.9  F (35.5  C)   Resp 16   Ht 1.397 m (4' 7\")   Wt 49.3 kg (108 lb 11.2 oz)   BMI 25.26 kg/m    Exam:  GENERAL APPEARANCE:  Alert, in no distress  ENT:  Mouth and posterior oropharynx normal, moist mucous membranes  EYES:  EOM, conjunctivae, lids, pupils and irises normal  RESP:  respiratory effort and palpation of chest normal, lungs clear to auscultation , no respiratory distress  CV:  Palpation and auscultation of heart done , regular rate and rhythm, no murmur, rub, or gallop  ABDOMEN:  normal bowel sounds, soft, nontender, no hepatosplenomegaly or other masses  M/S:   Lymphedema in BLE  SKIN:  Inspection of skin and subcutaneous tissue baseline, Palpation of skin and subcutaneous tissue baseline  NEURO:   Cranial nerves 2-12 are normal tested and grossly at patient's baseline  PSYCH:  affect and mood normal    Lab/Diagnostic data:  Labs done in SNF are in [facility] EPIC. Please refer to them using EPIC/Care " "Everywhere.    ASSESSMENT/PLAN:  Recurrent Falls. With recent fall over the weekend. Uses walker for mobility. Has been on home therapy in the past. Staff to provide assistance as indicated.    Lymphedema. With history of aortic lymphadenectomy and bilateral pelvic lymphadenectomy. Continue compression stockings as ordered.     Depression. Continue Fluoxetine as ordered.    Insomnia. Continue Melatonin as ordered.    Constipation with History of Small Bowel Obstruction in January 2022. Continue Senna as ordered.    Abnormal Imaging. Noted on 1/13/22 abdominal CT \"1.2 cm hypodensity in the right hepatic lobe\". Recommended liver MRI within 3 months. Will need to follow-up with patient to determine if this has been followed up on.    Orders:  None    Electronically signed by:  MATTIE Martinez CNP                       Sincerely,        MATTIE Martinez CNP    "

## 2022-10-20 DIAGNOSIS — F33.9 MAJOR DEPRESSIVE DISORDER, RECURRENT (H): Primary | ICD-10-CM

## 2022-11-11 ENCOUNTER — PATIENT OUTREACH (OUTPATIENT)
Dept: GERIATRIC MEDICINE | Facility: CLINIC | Age: 77
End: 2022-11-11

## 2022-11-11 NOTE — LETTER
November 11, 2022    Important [employer] Information    JOSEPH SIMON  [place]  [address] [place] [address]    Your New Care Coordinator  Dear Joseph,  My name is Theresa Nicole RN and I am your new Care Coordinator. You may reach me by calling [phone]. I will be in touch with you shortly to address any questions you may have.   I have also been in contact with your previous care coordinator, to ensure a smooth transition.  Questions?  Call me at [phone] Monday-Friday between 8am and 5pm. TTY: [phone]. I look forward to working with you as a [employer]  member.  Sincerely,    Theresa Nicole RN    E-mail: [email or web address]  Phone: [phone]      IPexpert    cc: member records                                                                                                                        CB5 ([facility]) (5-2020)    Civil Rights Notice  Discrimination is against the law. [employer] does not discriminate on the basis of any of the following:    Race    Color    National Origin    Creed    Pentecostalism    Age    Public Assistance Status    Receipt of Health Care Services    Disability (including physical or mental impairment)    Sex (including sex stereotypes and gender identity)    Marital Status    Political Beliefs    Medical Condition    Genetic Information    Sexual Orientation    Claims Experience    Medical History    Health Status    Auxiliary Aids and Services:  [employer] provides auxiliary aids and services, like qualified interpreters or information in accessible formats, free of charge and in a timely manner, to ensure an equal opportunity to participate in our health care programs. Contact [employer] at [employer]/contact medicaid or call [phone] (toll free); TTY:711 or at [email or web address].    Language Assistance Services:  [employer] provides translated documents and spoken language interpreting, free of charge and in a timely manner,  when language assistance services are necessary to ensure limited English speakers have meaningful access to our information and services. Contact [employer] at [phone] (toll free); TTY: 711 or [email or web address].     Civil Rights Complaints  You have the right to file a discrimination complaint if you believe you were treated in a discriminatory way by [employer]. You may contact any of the following four agencies directly to file a discrimination complaint.        U.S. Department of Health and Human Services  Office for Civil Rights (OCR)  You have the right to file a complaint with the OCR, a federal agency, if you believe you have been discriminated against because of any of the following:    Race    Disability    Color    Sex    National Origin    Age    Sabianist (in some cases)    Contact the OCR directly to file a complaint:         Director         U.S. Department of Health and Human Services  Office for Civil Rights         [address] [place] [address]         Customer Response Center: Toll-free: [phone]          TDD: [phone]         Email: [email or web address]    [place] Department of Human Rights (MDHR)  In [place], you have the right to file a complaint with the MDHR if you believe you have been discriminated against because of any of the following:      Race    Color    National Origin    Sabianist    Creed    Sex    Sexual Orientation    Marital Status    Public Assistance Status    Disability    Contact the MDHR directly to file a complaint:         [place] Department of Human Rights         [address] [place] [address]         [phone] (voice)          [phone] (toll free)         711 or [phone] ([place] Relay)         [phone] (Fax)         [email or web address] (Email)     [place] Department of Human Services (DHS)  You have the right to file a complaint with  DHS if you believe you have been discriminated against in our health care programs because of any of the following:    Race    Color    National Origin    Creed    Anabaptist    Age    Public Assistance Status    Receipt of Health Care Services    Disability (including physical or mental impairment)    Sex (including sex stereotypes and gender identity)    Marital Status    Political Beliefs    Medical Condition    Genetic Information    Sexual Orientation    Claims Experience    Medical History    Health Status    Complaints must be in writing and filed within 180 days of the date you discovered the alleged discrimination. The complaint must contain your name and address and describe the discrimination you are complaining about. After we get your complaint, we will review it and notify you in writing about whether we have authority to investigate. If we do, we will investigate the complaint.      [facility] will notify you in writing of the investigation s outcome. You have a right to appeal the outcome if you disagree with the decision. To appeal, you must send a written request to have [facility] review the investigation outcome. Be brief and state why you disagree with the decision. Include additional information you think is important.      If you file a complaint in this way, the people who work for the agency named in the complaint cannot retaliate against you. This means they cannot punish you in any way for filing a complaint. Filing a complaint in this way does not stop you from seeking out other legal or administration actions.     Contact [facility] directly to file a discrimination complaint:        Civil Rights Coordinator        [place] Department of Human Services        Equal Opportunity and Access Division        [address] [address]        [place] [address] [phone] (voice) or use your preferred relay service     [employer] Complaint Notice   You have the right to file a complaint with [employer] if you believe you  have been discriminated against because of any of the following:       Medical condition    Health status    Receipt of health care services    Claims experience    Medical history    Genetic information    Disability (including mental or physical impairment)    Marital status    Age    Sex (including sex stereotypes and gender identity)    Sexual orientation    National origin    Race    Color    Mu-ism    Creed    Public assistance status    Political beliefs    You can file a complaint and ask for help in filing a complaint in person or by mail, phone, fax, or email at:     [employer] Civil Rights Coordinator  [facility] [employer]  [address], [place] [address] [phone] (voice and fax) or TTZ:324  Email: [email or web address]    American Indians can begin or continue to use [facility] ([facility]) clinics. We will not require prior approval or impose any conditions for you to get services at these clinics. For elders age 65 years and older this includes Elderly Waiver (EW) services accessed through the [employer]. If a doctor or other provider in a [facility] or [facility] clinic refers you to a provider in our network, we will not require you to see your primary care provider prior to the referral.

## 2022-11-11 NOTE — PROGRESS NOTES
[facility] Care Coordination Contact    Member became effective with  [facility] on 11/1/22 [facility].  Previous Health Plan: [employer]  Previous Care System: [employer]  Previous care coordinators name and number: Isha Thurston  Declan Type: EW  Last MMIS Entry: Date 2/17/22 and Type 06 REASSMT   Services Listed in MMIS: 05 C RICARDO DINE 06 C HOMEMAKER 07 I MONEY MGT  08 C MED APPTS 25 F SUPPLIES 35 F CASE MGMT  62 C LAUNDRY 52 C EMERG RSP 61 C LESS 24  57 C PRSNL ASST 13 F PHY THPY 14 F OCCUP THPY  UTF received: No: Requested on UTF requested from [facility].      WL sent.     Jyothi Rashid  Care Management Specialist   [facility]   [phone]

## 2022-11-15 ENCOUNTER — ASSISTED LIVING VISIT (OUTPATIENT)
Dept: GERIATRICS | Facility: CLINIC | Age: 77
End: 2022-11-15
Payer: COMMERCIAL

## 2022-11-15 VITALS
OXYGEN SATURATION: 96 % | TEMPERATURE: 98 F | BODY MASS INDEX: 25.16 KG/M2 | HEIGHT: 55 IN | HEART RATE: 93 BPM | SYSTOLIC BLOOD PRESSURE: 153 MMHG | WEIGHT: 108.7 LBS | RESPIRATION RATE: 16 BRPM | DIASTOLIC BLOOD PRESSURE: 68 MMHG

## 2022-11-15 DIAGNOSIS — L98.9 SKIN LESION: Primary | ICD-10-CM

## 2022-11-15 NOTE — LETTER
"    11/15/2022        RE: Meera Ruvalcaba  [facility]  [address] [place] [address]        [facility]    Chief Complaint   Patient presents with     RECHECK     HPI:  Meera Ruvalcaba is a 76 year old  (1945), who is being seen today for an episodic care visit at: [facility] ([facility]) [[id number]].     Background:    This is a 76-year-old female, with a past medical history significant for a small bowel obstruction 1/12/22 secondary to adhesions managed conservatively, liver lesion on abdominal CT 1/13/22, depression, recurrent falls, cystocele, GERD, endometrial cancer s/p VIJI/BSO aortic lymphadenectomy and bilateral pelvic lymphadenectomy 2007, and lymphedema of bilateral lower extremities, who moved to [facility] on 2/28/22. She has now elected to be followed by [facility].    Today's concern is:     Left Cheek Lesion. Today, was asked by staff to see patient for skin concern on face. Per patient report, thinks had an infected hair follicle about 2 months ago. Popped it open and had been wearing a band aid on her face ever since. Took off the band aid and reports staff was concerned Denies pain.     Allergies, and PMH/PSH reviewed in EPIC today.  REVIEW OF SYSTEMS:  4 point ROS including Respiratory, CV, GI and , other than that noted in the HPI,  is negative    Objective:   BP (!) 153/68   Pulse 93   Temp 98  F (36.7  C)   Resp 16   Ht 1.397 m (4' 7\")   Wt 49.3 kg (108 lb 11.2 oz)   SpO2 96%   BMI 25.26 kg/m        Labs done in SNF are in [facility] EPIC. Please refer to them using EPIC/Care Everywhere.    Assessment/Plan:  Left Cheek Lesion. Questionable skin malignancy. Recommend follow-up with Dermatology. Following today's visit, provided staff with phone numbers for Dermatology Clinics in the area to arrange follow-up appointment.    History of Recurrent Falls. Uses walker for mobility. Staff to provide assistance as " "indicated.     Lymphedema. With history of aortic lymphadenectomy and bilateral pelvic lymphadenectomy. Continue compression stockings as ordered.      Depression. Continue Fluoxetine as ordered.     Insomnia. Continue Melatonin as ordered.     Constipation with History of Small Bowel Obstruction in January 2022. Continue Senna as ordered.     Abnormal Imaging. Noted on 1/13/22 abdominal CT \"1.2 cm hypodensity in the right hepatic lobe\". Recommended liver MRI within 3 months. Will need to follow-up with patient to determine if this has been followed up on at next visit.    Orders:  Referral to Dermatology    Electronically signed by: MATTIE Martinez CNP             Sincerely,        MATTIE Martinez CNP    "

## 2022-11-15 NOTE — PROGRESS NOTES
"[facility] GERIATRICS    Chief Complaint   Patient presents with     RECHECK     HPI:  Meera Ruvalcaba is a 76 year old  (1945), who is being seen today for an episodic care visit at: [facility] ([facility]) [[id number]].     Background:    This is a 76-year-old female, with a past medical history significant for a small bowel obstruction 1/12/22 secondary to adhesions managed conservatively, liver lesion on abdominal CT 1/13/22, depression, recurrent falls, cystocele, GERD, endometrial cancer s/p VIJI/BSO aortic lymphadenectomy and bilateral pelvic lymphadenectomy 2007, and lymphedema of bilateral lower extremities, who moved to [facility] on 2/28/22. She has now elected to be followed by [facility].    Today's concern is:     Left Cheek Lesion. Today, was asked by staff to see patient for skin concern on face. Per patient report, thinks had an infected hair follicle about 2 months ago. Popped it open and had been wearing a band aid on her face ever since. Took off the band aid and reports staff was concerned Denies pain.     Allergies, and PMH/PSH reviewed in EPIC today.  REVIEW OF SYSTEMS:  4 point ROS including Respiratory, CV, GI and , other than that noted in the HPI,  is negative    Objective:   BP (!) 153/68   Pulse 93   Temp 98  F (36.7  C)   Resp 16   Ht 1.397 m (4' 7\")   Wt 49.3 kg (108 lb 11.2 oz)   SpO2 96%   BMI 25.26 kg/m        Labs done in SNF are in [facility]. Please refer to them using [facility]/Care Everywhere.    Assessment/Plan:  Left Cheek Lesion. Questionable skin malignancy. Recommend follow-up with Dermatology. Following today's visit, provided staff with phone numbers for Dermatology Clinics in the area to arrange follow-up appointment.    History of Recurrent Falls. Uses walker for mobility. Staff to provide assistance as indicated.     Lymphedema. With history of aortic lymphadenectomy and bilateral pelvic lymphadenectomy. Continue " "compression stockings as ordered.      Depression. Continue Fluoxetine as ordered.     Insomnia. Continue Melatonin as ordered.     Constipation with History of Small Bowel Obstruction in January 2022. Continue Senna as ordered.     Abnormal Imaging. Noted on 1/13/22 abdominal CT \"1.2 cm hypodensity in the right hepatic lobe\". Recommended liver MRI within 3 months. Will need to follow-up with patient to determine if this has been followed up on at next visit.    Orders:  Referral to Dermatology    Electronically signed by: MATTIE Martinez CNP       "

## 2022-11-18 ENCOUNTER — TELEPHONE (OUTPATIENT)
Dept: GERIATRIC MEDICINE | Facility: CLINIC | Age: 77
End: 2022-11-18

## 2022-11-18 ENCOUNTER — PATIENT OUTREACH (OUTPATIENT)
Dept: GERIATRIC MEDICINE | Facility: CLINIC | Age: 77
End: 2022-11-18

## 2022-11-18 DIAGNOSIS — R53.1 GENERALIZED WEAKNESS: ICD-10-CM

## 2022-11-18 DIAGNOSIS — I89.0 LYMPHEDEMA OF BOTH LOWER EXTREMITIES: ICD-10-CM

## 2022-11-18 DIAGNOSIS — R29.6 RECURRENT FALLS: Primary | ICD-10-CM

## 2022-11-18 NOTE — PROGRESS NOTES
NINI galvez submitted to health plan.     Jyothi Rashid  Care Management Specialist   [facility]   [phone]

## 2022-11-18 NOTE — TELEPHONE ENCOUNTER
Nu Mario -   Morning - unsure if you received this message last week.  Family is requesting a couple items for Meera - She will need a Face to face visit for coverage [facility]    Transport w/c and Hospital bed have been requested by the patient. DME orders(s) have been queued up and pended for the provider to review, also please addend your recent note to include a F2F for these two items.     Patient reports the need for DME supplies due to lymphedema, weakness. She shared she has to attempt 4-6 x's to get out of her bed, and with her lymphamea the ability to raise the foot of the bed would be beneficial.    Once completed, please route the encounter to care coordinator to fax completed documentation and order requisition to [facility].     Please let me know if you have any additional questions.    Thank you   GLENYS Rodriguez  [facility]   Cell - [phone]

## 2022-11-18 NOTE — PROGRESS NOTES
[facility] Care Coordination Contact    [facility] Change (Transfer)    Member is new enrollee to [facility] effective 11-1-2022 with [facility]. Member transferred from [facility].    No home visit required because this care coordinator (CC) has received all required documentation from the previous CC.    Writer t/c to member, introduced self as member's new CC. Confirmed with member that the welcome letter with writer's name and contact information has been received.  Reviewed LTCC/Health Risk Assessment (HRA) and POC with member. No changes noted.  Transitional HRA completed. Care Plan Summary updated and reflects current services.  Required referral authorization information communicated to CMS: Yes    Writer reviewed the following with member:    ER visits: No  Hospitalizations: Yes -  [facility]   TCU stays: No  Significant health status changes: no stable at this time per Pastora Crow RN and maddison Howe  Falls/Injuries: Yes: felx x1 no injuries, assistance needed to get up - assisted by AL staff  ADL/IADL changes: No  Changes in services: Yes: request for Hospital bed and transport w/c.  Rx request sent to PCP    CC spoke at length with maddison Howe [phone].  She shared she is Meera's only family and her POA.  Email is:  [email or web address].  She will send CC a copy of the POA paperwork for the chart.  Carley states she is concern, as member is increasingly depressed, not eating well, and not going to the dining room for meals.  She states there are days she doesn't get dressed and is really struggling.  CC encouraged her to communicate with the NP on site to discuss and also encouraged her to get MyChart to ease her communication.   1.  She requested a hospital bed, due to inability to get OOB easily, at time it can take her 4-6 attempts to get out of bed.  Meera has a history of lymphema, increased edema.  A hospital bed would also help to raise her legs.   Also history of wounds/sores on her legs bilaterally.   2.  Requested a transport w/c, so she is able to get her out in the community.  Member has a walker, but is is not safe for her going any distance.  Height 4 ft 7 in [facility] 108#  CC sent request to PCP for rx and F2F documentation.     3.  Member is interested in finding a new AL to move to, she has several things she is unhappy with ie: limited activies, and programs.  Also she fell and waited >1 hour for assistance.  Request sent to LYNNE Merino to assist with finding options.  Meera would be interested in living anywhere in the [facility] ([place], [place], [place] to name a few)    Follow-Up Plan: Member informed of future contact, plan to f/u with member with at next regularly scheduled contact.  Contact information shared with member and family, encouraged member to call with any questions or concerns.  Theresa Nicole RN N  [facility]   cell - [phone]

## 2022-11-21 ENCOUNTER — PATIENT OUTREACH (OUTPATIENT)
Dept: GERIATRIC MEDICINE | Facility: CLINIC | Age: 77
End: 2022-11-21

## 2022-11-21 NOTE — PROGRESS NOTES
[facility] Care Coordination Contact     CHW, edson Nicole followed up w/ Jeremy Howe on housing. Follow up call w/ Carley scheduled for 11/23/22 @1pm.      LYNNE Becker  [facility]  [phone]

## 2022-11-25 ENCOUNTER — ASSISTED LIVING VISIT (OUTPATIENT)
Dept: GERIATRICS | Facility: CLINIC | Age: 77
End: 2022-11-25
Payer: COMMERCIAL

## 2022-11-25 VITALS — HEIGHT: 55 IN | WEIGHT: 108.7 LBS | BODY MASS INDEX: 25.16 KG/M2

## 2022-11-25 DIAGNOSIS — R29.6 RECURRENT FALLS: ICD-10-CM

## 2022-11-25 DIAGNOSIS — M62.81 MUSCLE WEAKNESS (GENERALIZED): ICD-10-CM

## 2022-11-25 DIAGNOSIS — I89.0 LYMPHEDEMA: Primary | ICD-10-CM

## 2022-11-25 NOTE — PROGRESS NOTES
"[facility] GERIATRICS  Chief Complaint   Patient presents with     RECHECK     HPI:  Meera Ruvalcaba is a 76 year old  (1945), who is being seen today for an episodic care visit at: [facility]) [[id number]].     Background:    This is a 76-year-old female, with a past medical history significant for a small bowel obstruction 1/12/22 secondary to adhesions managed conservatively, liver lesion on abdominal CT 1/13/22, depression, recurrent falls, cystocele, GERD, endometrial cancer s/p VIJI/BSO aortic lymphadenectomy and bilateral pelvic lymphadenectomy 2007, and lymphedema of bilateral lower extremities, who resides at [facility].    Today's concern is:     Recurrent Falls, Lymphedema and Weakness. Received notification from [facility] patient is inquiring about a hospital bed and wheelchair. Difficult to lift up legs to get into bed. Today, patient reports her friend thinks she would benefit from more equipment. Finished working with home therapy earlier this year.    Allergies, and PMH/PSH reviewed in [facility] today.  REVIEW OF SYSTEMS:  4 point ROS including Respiratory, CV, GI and , other than that noted in the HPI,  is negative    Objective:   Ht 1.397 m (4' 7\")   Wt 49.3 kg (108 lb 11.2 oz)   BMI 25.26 kg/m    GENERAL APPEARANCE:  Alert, in no distress  ENT:  Mouth and posterior oropharynx normal, moist mucous membranes  EYES:  EOM, conjunctivae, lids, pupils and irises normal  RESP:  no respiratory distress  PSYCH:  affect and mood normal    Labs done in SNF are in [facility]. Please refer to them using [facility]/Care Everywhere.    Assessment/Plan:  Recurrent Falls, Lymphedema and Weakness. With history of aortic lymphadenectomy and bilateral pelvic lymphadenectomy. Continue compression stockings as ordered. Will order hospital bed and wheelchair given limitations with mobility.    Left Cheek Lesion. Patient instructed to follow-up with Dermatology for " "biopsy.     Depression. Continue Fluoxetine as ordered.     Insomnia. Continue Melatonin as ordered.     Constipation with History of Small Bowel Obstruction in January 2022. Continue Senna as ordered.     Abnormal Imaging. Noted on 1/13/22 abdominal CT \"1.2 cm hypodensity in the right hepatic lobe\". Recommended liver MRI within 3 months. Will need to follow-up with patient to determine if this has been followed up on.    Orders:  F2F for hospital bed and wheelchair completed    Electronically signed by: MATTIE Martinez CNP     Wheelchair Documentation  Size: Lightweight transport chair  Corresponding cushion: Yes: standard  Standard foot rests: Yes  Elevating leg rests: Yes  Arm rests: Yes: standard  Lap tray: No  Dose the patient use oxygen? No   Is the patient able to propel wheelchair? Yes   1. The patient has mobility limitations that impairs their ability to participate in one or more mobility related activities: Toileting, Feeding and Grooming.  The wheelchair is suitable and necessary for use in the patient's home.  2. The patient's mobility limitations cannot be safely resolved by using a cane/walker:Yes    Reason why a cane or walker will not meet the patient's needs. (ie: balance, tolerance, level of assistance) balance, tolerance  3. The patients home has adequate access to use a manual wheelchair:Yes  4. The use of a manual wheelchair on a regular basis will improve the patients ability to participate in mobility related ADL's at home:Yes  5. The patient is willing to use a manual wheelchair at home:Yes  6. The patient has adequate upper body strength and the mental capability to safely use a manual wheelchair and/or has a caregiver that is able to assist: Yes  7. Does the patient have a lower extremity injury or edema?Yes  Reason for Type of Wheelchair  Patient weight: 0 lbs 0 oz  Light Weight Wheelchair: Patient is unable to self-propel a standard wheelchair in the home but can self " propel a light weight wheelchair.     Hospital Bed/Accessories Documentation  Hospital bed is required for body positioning, to allow for safe transfers to chair and standing and frequent changes in body position, not feasible in an ordinary bed    1. Does the patient require positioning of the body in ways not feasible with an ordinary bed due to a medical condition that is expected to last at least 1 month? Yes - due to lymphedema, weakness.  2. Does the patient require, for the alleviation of pain, positioning of the body in ways not feasible with an ordinary bed? Yes - due to lymphedema and weakness  3. Does the patient require the head of bed to be elevated more than 30 degrees most of the time due to congestive heart failure, chronic pulmonary disease or aspiration? No  4. Does the patient require traction that can only be attached to a hospital bed?  No  5. Does the patient require a bed height different than a fixed height hospital bed to permit transfers to a chair, wheelchair, or standing position? Yes Unable to lift legs into standard bed.  6. Does the patient require frequent changes in body position and/or have an immediate need for change in body position? Yes - lymphedema, weakness    MATTIE Martinez CNP

## 2022-11-25 NOTE — LETTER
"    11/25/2022        RE: Meera Ruvalcaba  [facility]  [address] [place] [address] [place] [address]        [facility] GERIATRICS  Chief Complaint   Patient presents with     RECHECK     HPI:  Meera Ruvalcaba is a 76 year old  (1945), who is being seen today for an episodic care visit at: [facility] [[id number]].     Background:    This is a 76-year-old female, with a past medical history significant for a small bowel obstruction 1/12/22 secondary to adhesions managed conservatively, liver lesion on abdominal CT 1/13/22, depression, recurrent falls, cystocele, GERD, endometrial cancer s/p VIJI/BSO aortic lymphadenectomy and bilateral pelvic lymphadenectomy 2007, and lymphedema of bilateral lower extremities, who resides at [facility].    Today's concern is:     Recurrent Falls, Lymphedema and Weakness. Received notification from [facility] patient is inquiring about a hospital bed and wheelchair. Difficult to lift up legs to get into bed. Today, patient reports her friend thinks she would benefit from more equipment. Finished working with home therapy earlier this year.    Allergies, and PMH/PSH [facility].  REVIEW OF SYSTEMS:  4 point ROS including Respiratory, CV, GI and , other than that noted in the HPI,  is negative    Objective:   Ht 1.397 m (4' 7\")   Wt 49.3 kg (108 lb 11.2 oz)   BMI 25.26 kg/m    GENERAL APPEARANCE:  Alert, in no distress  ENT:  Mouth and posterior oropharynx normal, moist mucous membranes  EYES:  EOM, conjunctivae, lids, pupils and irises normal  RESP:  no respiratory distress  PSYCH:  affect and mood normal    Labs done in SNF are in [facility]. Please refer to them using EPIC/Care Everywhere.    Assessment/Plan:  Recurrent Falls, Lymphedema and Weakness. With history of aortic lymphadenectomy and bilateral pelvic lymphadenectomy. Continue compression stockings as ordered. Will order hospital bed and wheelchair given " "limitations with mobility.    Left Cheek Lesion. Patient instructed to follow-up with Dermatology for biopsy.     Depression. Continue Fluoxetine as ordered.     Insomnia. Continue Melatonin as ordered.     Constipation with History of Small Bowel Obstruction in January 2022. Continue Senna as ordered.     Abnormal Imaging. Noted on 1/13/22 abdominal CT \"1.2 cm hypodensity in the right hepatic lobe\". Recommended liver MRI within 3 months. Will need to follow-up with patient to determine if this has been followed up on.    Orders:  F2F for hospital bed and wheelchair completed    Electronically signed by: MATTIE Martinez CNP     Wheelchair Documentation  Size: Lightweight transport chair  Corresponding cushion: Yes: standard  Standard foot rests: Yes  Elevating leg rests: Yes  Arm rests: Yes: standard  Lap tray: No  Dose the patient use oxygen? No   Is the patient able to propel wheelchair? Yes   1. The patient has mobility limitations that impairs their ability to participate in one or more mobility related activities: Toileting, Feeding and Grooming.  The wheelchair is suitable and necessary for use in the patient's home.  2. The patient's mobility limitations cannot be safely resolved by using a cane/walker:Yes    Reason why a cane or walker will not meet the patient's needs. (ie: balance, tolerance, level of assistance) balance, tolerance  3. The patients home has adequate access to use a manual wheelchair:Yes  4. The use of a manual wheelchair on a regular basis will improve the patients ability to participate in mobility related ADL's at home:Yes  5. The patient is willing to use a manual wheelchair at home:Yes  6. The patient has adequate upper body strength and the mental capability to safely use a manual wheelchair and/or has a caregiver that is able to assist: Yes  7. Does the patient have a lower extremity injury or edema?Yes  Reason for Type of Wheelchair  Patient weight: 0 lbs 0 oz  Light " Weight Wheelchair: Patient is unable to self-propel a standard wheelchair in the home but can self propel a light weight wheelchair.     Hospital Bed/Accessories Documentation  Hospital bed is required for body positioning, to allow for safe transfers to chair and standing and frequent changes in body position, not feasible in an ordinary bed    1. Does the patient require positioning of the body in ways not feasible with an ordinary bed due to a medical condition that is expected to last at least 1 month? Yes - due to lymphedema, weakness.  2. Does the patient require, for the alleviation of pain, positioning of the body in ways not feasible with an ordinary bed? Yes - due to lymphedema and weakness  3. Does the patient require the head of bed to be elevated more than 30 degrees most of the time due to congestive heart failure, chronic pulmonary disease or aspiration? No  4. Does the patient require traction that can only be attached to a hospital bed?  No  5. Does the patient require a bed height different than a fixed height hospital bed to permit transfers to a chair, wheelchair, or standing position? Yes Unable to lift legs into standard bed.  6. Does the patient require frequent changes in body position and/or have an immediate need for change in body position? Yes - lymphedema, weakness    MATTIE Martinez CNP          Sincerely,        MATTIE Martinez CNP

## 2022-11-28 ENCOUNTER — PATIENT OUTREACH (OUTPATIENT)
Dept: GERIATRIC MEDICINE | Facility: CLINIC | Age: 77
End: 2022-11-28

## 2022-11-28 NOTE — PROGRESS NOTES
[facility] Care Coordination Contact     CHW, spoke w/ Carley( Mbr's niece) on Mbr housing and appts. CHW,will follow up w/ Carley on AL locations for Mbr preferably [facility]. CHW,also schedule appts  noted below.    Appts    [facility] [phone] - 12/28 @10am     [facility] [place]-  Eye exam- w/ Dr Wick 12/19/22@11:15am     [facility]  [place]- Dermatology-5/23/23@ 9:45    Confirmed appts w/  Carley.     LYNNE Becker  [facility]  [phone]

## 2022-11-30 ENCOUNTER — PATIENT OUTREACH (OUTPATIENT)
Dept: GERIATRIC MEDICINE | Facility: CLINIC | Age: 77
End: 2022-11-30

## 2022-12-09 NOTE — PROGRESS NOTES
[facility] Care Coordination Contact    11-  Order and F2F send to [facility].    Following email sent to family  Update on transport chair and hospital bed.    I just received the orders and  documentation of need  from the on site care team.  This has been sent to [facility] ([phone]), they will process the request - get approval from [employer] and will be able to ship.      Theresa Nicole RN PHN  [facility]   Cell - [phone]    12-9-2022  CC f/u with family to inquire if shipment has been received.    CC contacted [facility] - they required additional paperwork, which CC faxed to Alexandra at Geriatric Services office.  CC will f/u with [facility] next week.  Theresa Nicole RN PHN  [facility]   Cell - [phone]

## 2022-12-12 ENCOUNTER — TELEPHONE (OUTPATIENT)
Dept: GERIATRICS | Facility: CLINIC | Age: 77
End: 2022-12-12

## 2022-12-13 ENCOUNTER — ASSISTED LIVING VISIT (OUTPATIENT)
Dept: GERIATRICS | Facility: CLINIC | Age: 77
End: 2022-12-13
Payer: COMMERCIAL

## 2022-12-13 VITALS — BODY MASS INDEX: 25.1 KG/M2 | WEIGHT: 108 LBS | TEMPERATURE: 98 F

## 2022-12-13 DIAGNOSIS — U07.1 COVID-19: Primary | ICD-10-CM

## 2022-12-13 PROCEDURE — 99207 PR NO BILLABLE SERVICE THIS VISIT: CPT | Performed by: NURSE PRACTITIONER

## 2022-12-13 NOTE — LETTER
12/13/2022        RE: Meera Ruvalcaba  [facility] [place]  [address] [place] [address]  [place] [address]        Orders:    Molnuprivir 200mg tablet: Give 800mg PO q12h for 5 days  o Dx COVID    Dr. Alexandra Guzman, APRN, DNP, AGNP-BC, PMHNP-BC  [facility]  Office Hours: Tues-Fri 5023-3689  Office: [address] [place] [address]  Fax - [phone]  Triage Phone- [phone]  Business Office- [phone]      Email: [email or web address]                 Sincerely,        MATTIE Keith CNP

## 2022-12-14 ENCOUNTER — TELEPHONE (OUTPATIENT)
Dept: GERIATRICS | Facility: CLINIC | Age: 77
End: 2022-12-14

## 2022-12-14 NOTE — PROGRESS NOTES
Orders:    Molnuprivir 200mg tablet: Give 800mg PO q12h for 5 days  o Dx COVID    Dr. Alexandra Guzman, APRN, DNP, AGNP-BC, PMHNP-BC  [facility]  Office Hours: Tues-Fri 2359-8647  Office: [address] [place] [address]  Fax - [phone]  Triage Phone- [phone]  Business Office- [phone]      Email: [email or web address]

## 2022-12-15 DIAGNOSIS — R19.7 DIARRHEA: Primary | ICD-10-CM

## 2022-12-15 RX ORDER — LOPERAMIDE HCL 2 MG
2 CAPSULE ORAL 2 TIMES DAILY
COMMUNITY
Start: 2022-12-15 | End: 2022-12-15

## 2022-12-15 RX ORDER — LOPERAMIDE HCL 2 MG
2 CAPSULE ORAL 2 TIMES DAILY
Qty: 10 CAPSULE | Refills: 0 | Status: SHIPPED | OUTPATIENT
Start: 2022-12-15

## 2022-12-16 ENCOUNTER — PATIENT OUTREACH (OUTPATIENT)
Dept: GERIATRIC MEDICINE | Facility: CLINIC | Age: 77
End: 2022-12-16

## 2022-12-16 NOTE — PROGRESS NOTES
Encounter opened due to Regulatory [employer] Update to open FVP Program.    Anne Castañeda  Case Management Specialist  [facility]  [phone]

## 2022-12-16 NOTE — PROGRESS NOTES
Encounter opened due to Regulatory [employer] Update to close FVP Program.    Anne Castañeda  Case Management Specialist  [facility]  [phone]

## 2022-12-26 ENCOUNTER — TELEPHONE (OUTPATIENT)
Dept: GERIATRICS | Facility: CLINIC | Age: 77
End: 2022-12-26

## 2022-12-26 NOTE — TELEPHONE ENCOUNTER
[facility] Nurse FYI Fax Note    Provider: MATTIE Pierce DNP  Facility: [facility]  Facility Type:  AL    Sender: Stephany  Call Back Number: [phone]    No Known Allergies    FYI Fax received: Fall w/o injury           Action Taken: Notified provider    FYI Fax sent to Provider:  MATTIE Pierce DNP Chloe Hanson, RN

## 2022-12-28 NOTE — PROGRESS NOTES
[facility] Care Coordination Contact    12-12-22  CC visited [facility] for introductions and meet DON to review POC.    CC spoke with DON - Pastora Crow shared member has not been feeling well, NP will visit her tomorrow.  High risk of falls - CC shared work toward getting a hospital bed for her - waititng on documentation.  Member walks with walker with high arms.    CC shared annual assessment is due in January, so visit will take place next month.    CC also shared maddison Howe is working with CHW on relocation to another AL, Carley is touring AL's for relocation.  Theresa Nicole RN PHN  [facility]   cell - [phone]

## 2023-01-10 ENCOUNTER — ASSISTED LIVING VISIT (OUTPATIENT)
Dept: GERIATRICS | Facility: CLINIC | Age: 78
End: 2023-01-10
Payer: COMMERCIAL

## 2023-01-10 DIAGNOSIS — I89.0 LYMPHEDEMA OF BOTH LOWER EXTREMITIES: ICD-10-CM

## 2023-01-10 DIAGNOSIS — U07.1 COVID-19: ICD-10-CM

## 2023-01-10 DIAGNOSIS — R29.6 RECURRENT FALLS: ICD-10-CM

## 2023-01-10 DIAGNOSIS — R53.1 GENERALIZED WEAKNESS: Primary | ICD-10-CM

## 2023-01-10 PROCEDURE — 99349 HOME/RES VST EST MOD MDM 40: CPT | Mod: CS | Performed by: NURSE PRACTITIONER

## 2023-01-10 NOTE — LETTER
1/10/2023        RE: Meera Ruvalcaba  [facility]  [address] [place] [address]        M [facility] GERIATRICS    Chief Complaint   Patient presents with     SUSAN     [place] Medical Record Number:  [id number]  Place of Service where encounter took place:  [facility]) [[id number]]    HPI:  Meera Ruvalcaba is a 77 year old  (1945), who is being seen today for an episodic care visit at: [facility] ([facility]) [[id number]]. Today's concern is:      Generalized weakness  Recurrent falls  Lymphedema of both lower extremities  COVID-19    Patient seen today for an acute follow-up visit in [facility] per staff request after noting an decrease in mobility and ability to complete ADLs. Patient notes she does feel a bit more weak since having COVID recently. She denies pain or acute associated concerns. However, she is at increased risk for falls given her decline. Therapies previously ordered with unable to be initiated due to insurance coverage. Appetite is improving. She is sleeping well. Denies CP, palpitations, fatigue, nausea, vomiting, increased SOB/NAYAK, fever, chills, and/or b/b concerns today.    ALLERGIES:Sulfa drugs  PAST MEDICAL HISTORY:   Past Medical History:   Diagnosis Date     Ascites 7/27/2007    ICD 10     Cystocele 7/27/2007     Generalized weakness 12/26/2020     GERD (gastroesophageal reflux disease) 7/20/2007     Recurrent falls 12/26/2020     PAST SURGICAL HISTORY:   has a past surgical history that includes Lymphadenectomy (01/16/2007); TOTAL ABDOM HYSTERECTOMY (01/16/2007); and Salpingo-oophorectomy bilateral.  FAMILY HISTORY: family history includes Cancer in her brother, father, maternal grandfather, and mother; Prostate Cancer in her father and maternal grandfather.  SOCIAL HISTORY:  reports that she has never smoked. She has never used smokeless tobacco. She reports that she does not drink alcohol and does not use drugs.    MEDICATIONS:  Current  Outpatient Medications   Medication Sig Dispense Refill     acetaminophen (TYLENOL) 500 MG tablet Take 1,000 mg by mouth every 8 hours as needed for mild pain       FLUoxetine (PROZAC) 20 MG capsule TAKE 1 CAPSULE BY MOUTH TWICE DAILY 180 capsule 97     loperamide (IMODIUM) 2 MG capsule Take 1 capsule (2 mg) by mouth 2 times daily X 5 days 10 capsule 0     magnesium hydroxide (MILK OF MAGNESIA) 400 MG/5ML suspension Take 5 mLs by mouth daily as needed for constipation or heartburn       melatonin 3 MG tablet Take 1 mg by mouth At Bedtime       molnupiravir (LAGEVRIO) 200 MG capsule Take 4 capsules (800 mg) by mouth every 12 hours 40 each 0     order for DME Equipment being ordered: Depends used 3 per day.  Size small 7 Box 11     senna (SENOKOT) 8.6 MG tablet Take 2 tablets by mouth daily       Vitamin D3 (CHOLECALCIFEROL) 25 mcg (1000 units) tablet Take 1 tablet by mouth daily         MED REC REQUIRED  Post Medication Reconciliation Status: patient was not discharged from an inpatient facility or TCU    REVIEW OF SYSTEMS:  10 point ROS of systems including Constitutional, Eyes, Respiratory, Cardiovascular, Gastroenterology, Genitourinary, Integumentary, Musculoskeletal, Psychiatric were all negative except for pertinent positives noted in my HPI.    Objective:   BP (P) 101/86    GENERAL APPEARANCE:  Alert, in no distress, cooperative  EYES:  EOM, conjunctivae, lids, pupils and irises normal, wears glasses  RESP:  respiratory effort and palpation of chest normal, lungs clear to auscultation , no respiratory distress  CV:  Palpation and auscultation of heart done , regular rate and rhythm, no murmur, rub, or gallop, peripheral edema 2-3+ in BLE  M/S:   Gait and station abnormal - ELOY 2/2 resident being in chair  Digits and nails abnormal - arthritic changes present  NEURO:   Cranial nerves 2-12 are normal tested and grossly at patient's baseline  PSYCH:  oriented to 2-3, insight and judgement impaired, memory  impaired , affect and mood normal      Recent labs in [facility] reviewed by me today.   CBC RESULTS:   Recent Labs   Lab Test 04/22/22  0940 01/21/21  0727   WBC 5.8 10.6   RBC 4.15 3.97   HGB 13.0 11.7*   HCT 39.7 37.2   MCV 96 94   MCH 31.3 29.5   MCHC 32.7 31.5*   RDW 12.7 13.2    300       Last Basic Metabolic Panel:  Recent Labs   Lab Test 04/22/22  0940 02/01/21  0831 12/27/20  0610    142 138   POTASSIUM 3.8  --  3.7   CHLORIDE 105  --  106   GABY 9.7  --  8.5   CO2 24  --  30   BUN 13  --  16   CR 0.82  --  0.65   *  --  93       Liver Function Studies -   Recent Labs   Lab Test 12/26/20  1140 07/08/19  0824   PROTTOTAL 7.0 7.2   ALBUMIN 3.3* 3.9   BILITOTAL 1.1 0.5   ALKPHOS 85 76   AST 27 14   ALT 16 18       Assessment/Plan:    ICD-10-CM    1. Generalized weakness  R53.1 Acute on chronic - unstable. Increased   2. Recurrent falls  R29.6 Weakness noted as per HPI since COVID   3. Lymphedema of both lower extremities  I89.0 Diagnosis in December - resident agrees   4. COVID-19  U07.1 To meet with therapies - PT/OT eval/tx for safety and strengthening - hopeful that with new year her insurance will cover these therapies.        MDM:  Assessment requiring an independent historian(s) - assisted living staff  Discussion of management or test interpretation with external physician/other qualified healthcare professional/appropriate source - assisted living staff  Diagnosis or treatment significantly limited by social determinants of health - [facility]  Ordering of appropriate home care services    Electronically signed by:   Dr. Alexandra Guzman, APRN, DNP, AGNP-BC, PMHNP-[facility], [facility]  Office Hours: Tues-Fri 7413-3927  Office: [address] [place] [address]  Fax - [phone]  Triage Phone- [phone]  Business Office- [phone]      Email: [email or web address]       Documentation of Face to Face and Certification for Home Health Services    I certify that patient:  Meera Ruvalcaba is under my care and that I, or a nurse practitioner or physician's assistant working with me, had a face-to-face encounter that meets the physician face-to-face encounter requirements with this patient on: 1/10/2023.    This encounter with the patient was in whole, or in part, for the following medical condition, which is the primary reason for home health care: The primary encounter diagnosis was Generalized weakness. Diagnoses of Recurrent falls, Lymphedema of both lower extremities, and COVID-19 were also pertinent to this visit.    I certify that, based on my findings, the following services are medically necessary home health services: Occupational Therapy and Physical Therapy.    My clinical findings support the need for the above services because: Occupational Therapy Services are needed to assess and treat cognitive ability and address ADL safety due to impairment in physical abilites 2/2 above noted diagnoses. and Physical Therapy Services are needed to assess and treat the following functional impairments: physical limitations 2/2 above noted diagnsoes.    Further, I certify that my clinical findings support that this patient is homebound (i.e. absences from home require considerable and taxing effort and are for medical reasons or [employer] services or infrequently or of short duration when for other reasons) because: Requires assistance of another person or specialized equipment to access medical services because patient: Is unable to walk greater than 250 feet without rest...    Based on the above findings. I certify that this patient is confined to the home and needs intermittent skilled nursing care, physical therapy and/or speech therapy.  The patient is under my care, and I have initiated the establishment of the plan of care.  This patient will be followed by a physician who will periodically review the plan of care.  Physician/Provider to provide follow up care: Shelbi Guzman  Myra    Attending hospital physician (the Medicare certified [facility] provider): Dr.Sara Iris MD, signing F2F and only signing for initial order. Please send all follow up questions and concerns or needed follow up signatures to the PCP, who [facility] has on file as:  Shelbi Gzuman.    Physician Signature: See electronic signature associated with these discharge orders.  Date: Phillip 10, 2023        Sincerely,        MATTIE Keith CNP

## 2023-01-10 NOTE — PROGRESS NOTES
[facility] GERIATRICS    Chief Complaint   Patient presents with     SUSAN     [place] Medical Record Number:  [id number]  Place of Service where encounter took place:  [facility]) [[id number]]    HPI:  Meera Ruvalcaba is a 77 year old  (1945), who is being seen today for an episodic care visit at: [facility] ([facility]) [[id number]]. Today's concern is:      Generalized weakness  Recurrent falls  Lymphedema of both lower extremities  COVID-19    Patient seen today for an acute follow-up visit in [facility] per staff request after noting an decrease in mobility and ability to complete ADLs. Patient notes she does feel a bit more weak since having COVID recently. She denies pain or acute associated concerns. However, she is at increased risk for falls given her decline. Therapies previously ordered with unable to be initiated due to insurance coverage. Appetite is improving. She is sleeping well. Denies CP, palpitations, fatigue, nausea, vomiting, increased SOB/NAYAK, fever, chills, and/or b/b concerns today.    ALLERGIES:Sulfa drugs  PAST MEDICAL HISTORY:   Past Medical History:   Diagnosis Date     Ascites 7/27/2007    ICD 10     Cystocele 7/27/2007     Generalized weakness 12/26/2020     GERD (gastroesophageal reflux disease) 7/20/2007     Recurrent falls 12/26/2020     PAST SURGICAL HISTORY:   has a past surgical history that includes Lymphadenectomy (01/16/2007); TOTAL ABDOM HYSTERECTOMY (01/16/2007); and Salpingo-oophorectomy bilateral.  FAMILY HISTORY: family history includes Cancer in her brother, father, maternal grandfather, and mother; Prostate Cancer in her father and maternal grandfather.  SOCIAL HISTORY:  reports that she has never smoked. She has never used smokeless tobacco. She reports that she does not drink alcohol and does not use drugs.    MEDICATIONS:  Current Outpatient Medications   Medication Sig Dispense Refill     acetaminophen (TYLENOL) 500 MG tablet Take 1,000 mg by  mouth every 8 hours as needed for mild pain       FLUoxetine (PROZAC) 20 MG capsule TAKE 1 CAPSULE BY MOUTH TWICE DAILY 180 capsule 97     loperamide (IMODIUM) 2 MG capsule Take 1 capsule (2 mg) by mouth 2 times daily X 5 days 10 capsule 0     magnesium hydroxide (MILK OF MAGNESIA) 400 MG/5ML suspension Take 5 mLs by mouth daily as needed for constipation or heartburn       melatonin 3 MG tablet Take 1 mg by mouth At Bedtime       molnupiravir (LAGEVRIO) 200 MG capsule Take 4 capsules (800 mg) by mouth every 12 hours 40 each 0     order for DME Equipment being ordered: Depends used 3 per day.  Size small 7 Box 11     senna (SENOKOT) 8.6 MG tablet Take 2 tablets by mouth daily       Vitamin D3 (CHOLECALCIFEROL) 25 mcg (1000 units) tablet Take 1 tablet by mouth daily         MED REC REQUIRED  Post Medication Reconciliation Status: patient was not discharged from an inpatient facility or TCU    REVIEW OF SYSTEMS:  10 point ROS of systems including Constitutional, Eyes, Respiratory, Cardiovascular, Gastroenterology, Genitourinary, Integumentary, Musculoskeletal, Psychiatric were all negative except for pertinent positives noted in my HPI.    Objective:   BP (P) 101/86    GENERAL APPEARANCE:  Alert, in no distress, cooperative  EYES:  EOM, conjunctivae, lids, pupils and irises normal, wears glasses  RESP:  respiratory effort and palpation of chest normal, lungs clear to auscultation , no respiratory distress  CV:  Palpation and auscultation of heart done , regular rate and rhythm, no murmur, rub, or gallop, peripheral edema 2-3+ in BLE  M/S:   Gait and station abnormal - ELOY 2/2 resident being in chair  Digits and nails abnormal - arthritic changes present  NEURO:   Cranial nerves 2-12 are normal tested and grossly at patient's baseline  PSYCH:  oriented to 2-3, insight and judgement impaired, memory impaired , affect and mood normal      Recent labs in [facility] reviewed by me today.   CBC RESULTS:   Recent Labs   Lab Test  04/22/22  0940 01/21/21  0727   WBC 5.8 10.6   RBC 4.15 3.97   HGB 13.0 11.7*   HCT 39.7 37.2   MCV 96 94   MCH 31.3 29.5   MCHC 32.7 31.5*   RDW 12.7 13.2    300       Last Basic Metabolic Panel:  Recent Labs   Lab Test 04/22/22  0940 02/01/21  0831 12/27/20  0610    142 138   POTASSIUM 3.8  --  3.7   CHLORIDE 105  --  106   GABY 9.7  --  8.5   CO2 24  --  30   BUN 13  --  16   CR 0.82  --  0.65   *  --  93       Liver Function Studies -   Recent Labs   Lab Test 12/26/20  1140 07/08/19  0824   PROTTOTAL 7.0 7.2   ALBUMIN 3.3* 3.9   BILITOTAL 1.1 0.5   ALKPHOS 85 76   AST 27 14   ALT 16 18       Assessment/Plan:    ICD-10-CM    1. Generalized weakness  R53.1 Acute on chronic - unstable. Increased   2. Recurrent falls  R29.6 Weakness noted as per HPI since COVID   3. Lymphedema of both lower extremities  I89.0 Diagnosis in December - resident agrees   4. COVID-19  U07.1 To meet with therapies - PT/OT eval/tx for safety and strengthening - hopeful that with new year her insurance will cover these therapies.        MDM:  Assessment requiring an independent historian(s) - assisted living staff  Discussion of management or test interpretation with external physician/other qualified healthcare professional/appropriate source - assisted living staff  Diagnosis or treatment significantly limited by social determinants of health - [facility]  Ordering of appropriate home care services    Electronically signed by:   Dr. Alexandra Guzman, APRN, DNP, AGNP-BC, PMHNP-[facility]  Office Hours: Tues-Fri 5061-3472  Office: [address] [place] [address]  Fax - [phone]  Triage Phone- [phone]  Business Office- [phone]      Email: [email or web address]       Documentation of Face to Face and Certification for Home Health Services    I certify that patient: Meera Ruvalcaba is under my care and that I, or a nurse practitioner or physician's assistant working with me, had a  face-to-face encounter that meets the physician face-to-face encounter requirements with this patient on: 1/10/2023.    This encounter with the patient was in whole, or in part, for the following medical condition, which is the primary reason for home health care: The primary encounter diagnosis was Generalized weakness. Diagnoses of Recurrent falls, Lymphedema of both lower extremities, and COVID-19 were also pertinent to this visit.    I certify that, based on my findings, the following services are medically necessary home health services: Occupational Therapy and Physical Therapy.    My clinical findings support the need for the above services because: Occupational Therapy Services are needed to assess and treat cognitive ability and address ADL safety due to impairment in physical abilites 2/2 above noted diagnoses. and Physical Therapy Services are needed to assess and treat the following functional impairments: physical limitations 2/2 above noted diagnsoes.    Further, I certify that my clinical findings support that this patient is homebound (i.e. absences from home require considerable and taxing effort and are for medical reasons or [employer] services or infrequently or of short duration when for other reasons) because: Requires assistance of another person or specialized equipment to access medical services because patient: Is unable to walk greater than 250 feet without rest...    Based on the above findings. I certify that this patient is confined to the home and needs intermittent skilled nursing care, physical therapy and/or speech therapy.  The patient is under my care, and I have initiated the establishment of the plan of care.  This patient will be followed by a physician who will periodically review the plan of care.  Physician/Provider to provide follow up care: Shelbi Guzman    Attending hospital physician (the Medicare certified [place] provider): Dr.Sara Iris MD, signing F2F and only  signing for initial order. Please send all follow up questions and concerns or needed follow up signatures to the PCP, who [place] has on file as:  Shelbi Guzman.    Physician Signature: See electronic signature associated with these discharge orders.  Date: Phillip 10, 2023

## 2023-01-12 NOTE — PROGRESS NOTES
1-  CC set up appointment with maddison Howe for home visit.  Scheduled 1-20-23 at her apt in the [place].    CC reached out to [facility] for status of Hospital Bed and transport chair which were ordered on 11-30-23    Spoke with Keturah [phone].

## 2023-01-16 ENCOUNTER — PATIENT OUTREACH (OUTPATIENT)
Dept: GERIATRIC MEDICINE | Facility: CLINIC | Age: 78
End: 2023-01-16

## 2023-01-20 ENCOUNTER — PATIENT OUTREACH (OUTPATIENT)
Dept: GERIATRIC MEDICINE | Facility: CLINIC | Age: 78
End: 2023-01-20
Payer: COMMERCIAL

## 2023-01-20 ENCOUNTER — TELEPHONE (OUTPATIENT)
Dept: GERIATRIC MEDICINE | Facility: CLINIC | Age: 78
End: 2023-01-20
Payer: COMMERCIAL

## 2023-01-20 DIAGNOSIS — N39.3 FEMALE STRESS INCONTINENCE: Primary | ICD-10-CM

## 2023-01-20 ASSESSMENT — ACTIVITIES OF DAILY LIVING (ADL)
DEPENDENT_IADLS:: CLEANING;COOKING;LAUNDRY;SHOPPING;MEAL PREPARATION;MEDICATION MANAGEMENT;MONEY MANAGEMENT;TRANSPORTATION;INCONTINENCE

## 2023-01-20 NOTE — PROGRESS NOTES
[facility] Care Coordination Contact    [facility] Home Visit Assessment     Home visit for Health Risk Assessment with Meera Ruvalcaba completed on January 20, 2023    Type of residence:: Assisted living  Current living arrangement:: I live in assisted living     Assessment completed with:: Patient, Care Team Member, Family (of al Howe) whom is her POA    Current Care Plan  Member currently receiving the following home care services: Physicial Therapy, Occupational Therapy   Member currently receiving the following community resources:    Hospital bed delivered yesterday from [facility], [facility] contacted [employer] for delivery of transport chair - spoke with [email or web address].  Email sent to NP for completion of medical necessity paperwork.    Medication Review  Medication reconciliation completed in Epic: Yes  Medication set-up & administration: RN set up weekly.  Assisting Living staff administers medications.  Medication Risk Assessment Medication (1 or more, place referral to MTM): Taking 1 or more high-risk medications for adults >65 years  MTM Referral Placed: No: not at this time due to plan to relocate    Mental/Behavioral Health   Depression Screening:           Mental health DX:: No        Falls Assessment:   Fallen 2 or more times in the past year?: Yes   Any fall with injury in the past year?: No    ADL/IADL Dependencies:   Dependent ADLs:: Ambulation-walker, Bathing, Dressing, Grooming, Incontinence, Positioning, Transfers, Wheelchair-with assist, Toileting  Dependent IADLs:: Cleaning, Cooking, Laundry, Shopping, Meal Preparation, Medication Management, Money Management, Transportation, Incontinence    [facility] sponsored benefits: Shared information re: Silver Sneakers/gym memberships, ASA, Calcium +D.    PCA Assessment completed at visit: Not Applicable     Elderly Waiver Eligibility: Yes-will continue on EW    Care Plan & Recommendations: CC is working with family and  member to relocate her to another AL, that can meet her needs.  See RS tool for details.    See CC for detailed assessment information.    Follow-Up Plan: Member informed of future contact, plan to f/u with member with a 6 month telephone assessment.  Contact information shared with member and family, encouraged member to call with any questions or concerns at any time.    [place] care continuum providers: Please see Snapshot and Care Management Flowsheets for Specific details of care plan.    This CC note routed to PCP.  Theresa Nicole RN PHN  [facility]   Cell - [phone]    Monitoring shipment of transport chair - ordered for member on 11-30-22  [facility] 1-28[facility]23 -  Email sent to [facility] [employer] 2-4-23  Theresa Nicole RN PHN  [facility]   Cell - [phone]

## 2023-01-20 NOTE — TELEPHONE ENCOUNTER
Alexandra -   DME supply(s) have been requested by the patient. DME orders(s) have been queued up and pended for the provider to review. Patient reports the need for DME supplies due to bowel and bladder incontinence. Once completed, please route the encounter to me the care coordinator to fax completed documentation and order requisition to [facility].  Theresa Nicole RN N  [facility]   cell - [phone]

## 2023-01-24 ENCOUNTER — DOCUMENTATION ONLY (OUTPATIENT)
Dept: OTHER | Facility: CLINIC | Age: 78
End: 2023-01-24
Payer: COMMERCIAL

## 2023-01-26 ENCOUNTER — ASSISTED LIVING VISIT (OUTPATIENT)
Dept: GERIATRICS | Facility: CLINIC | Age: 78
End: 2023-01-26
Payer: COMMERCIAL

## 2023-01-26 VITALS
HEART RATE: 77 BPM | OXYGEN SATURATION: 96 % | WEIGHT: 108.7 LBS | RESPIRATION RATE: 18 BRPM | DIASTOLIC BLOOD PRESSURE: 86 MMHG | SYSTOLIC BLOOD PRESSURE: 101 MMHG | TEMPERATURE: 98 F | BODY MASS INDEX: 25.16 KG/M2 | HEIGHT: 55 IN

## 2023-01-26 DIAGNOSIS — U07.1 COVID-19: ICD-10-CM

## 2023-01-26 DIAGNOSIS — R19.7 DIARRHEA, UNSPECIFIED TYPE: ICD-10-CM

## 2023-01-26 DIAGNOSIS — K59.00 CONSTIPATION, UNSPECIFIED CONSTIPATION TYPE: ICD-10-CM

## 2023-01-26 DIAGNOSIS — R29.6 RECURRENT FALLS: ICD-10-CM

## 2023-01-26 DIAGNOSIS — R53.1 GENERALIZED WEAKNESS: Primary | ICD-10-CM

## 2023-01-26 DIAGNOSIS — F33.9 RECURRENT MAJOR DEPRESSIVE DISORDER, REMISSION STATUS UNSPECIFIED (H): ICD-10-CM

## 2023-01-26 DIAGNOSIS — I89.0 LYMPHEDEMA OF BOTH LOWER EXTREMITIES: ICD-10-CM

## 2023-01-26 DIAGNOSIS — M62.81 MUSCLE WEAKNESS (GENERALIZED): ICD-10-CM

## 2023-01-26 DIAGNOSIS — G47.00 INSOMNIA, UNSPECIFIED TYPE: ICD-10-CM

## 2023-01-26 DIAGNOSIS — I89.0 LYMPHEDEMA: ICD-10-CM

## 2023-01-26 PROCEDURE — 99358 PROLONG SERVICE W/O CONTACT: CPT | Performed by: NURSE PRACTITIONER

## 2023-01-26 NOTE — LETTER
1/26/2023        RE: Meera Ruvalcaba  [place]  [address]   [facility] [address]        [facility] GERIATRICS    No chief complaint on file.    [facility] Record Number:  [id number]  Place of Service where encounter took place:  No question data found.    HPI:  Meera Ruvalcaba is a 77 year old  (1945), who is being seen today for an episodic care visit at: No question data found.. Today's concern is:   Data Unavailable    Patient seen today for *** visit in ***. Patient notes ***. ***. Therapies ***. Appetite ***. *** sleeping well. Denies CP, palpitations, fatigue, nausea, vomiting, increased SOB/NAYAK, fever, chills, and/or b/b concerns today.    ALLERGIES:Sulfa drugs  PAST MEDICAL HISTORY:   Past Medical History:   Diagnosis Date     Ascites 7/27/2007    ICD 10     Cystocele 7/27/2007     Generalized weakness 12/26/2020     GERD (gastroesophageal reflux disease) 7/20/2007     Recurrent falls 12/26/2020     PAST SURGICAL HISTORY:   has a past surgical history that includes Lymphadenectomy (01/16/2007); TOTAL ABDOM HYSTERECTOMY (01/16/2007); and Salpingo-oophorectomy bilateral.  FAMILY HISTORY: family history includes Cancer in her brother, father, maternal grandfather, and mother; Prostate Cancer in her father and maternal grandfather.  SOCIAL HISTORY:  reports that she has never smoked. She has never used smokeless tobacco. She reports that she does not drink alcohol and does not use drugs.    MEDICATIONS:  Current Outpatient Medications   Medication Sig Dispense Refill     acetaminophen (TYLENOL) 500 MG tablet Take 1,000 mg by mouth every 8 hours as needed for mild pain       FLUoxetine (PROZAC) 20 MG capsule TAKE 1 CAPSULE BY MOUTH TWICE DAILY 180 capsule 97     loperamide (IMODIUM) 2 MG capsule Take 1 capsule (2 mg) by mouth 2 times daily X 5 days 10 capsule 0     magnesium hydroxide (MILK OF MAGNESIA) 400 MG/5ML suspension Take 5 mLs by mouth daily as needed for  constipation or heartburn       melatonin 3 MG tablet Take 1 mg by mouth At Bedtime       molnupiravir (LAGEVRIO) 200 MG capsule Take 4 capsules (800 mg) by mouth every 12 hours 40 each 0     order for DME Equipment being ordered: Depends used 3 per day.  Size small 7 Box 11     senna (SENOKOT) 8.6 MG tablet Take 2 tablets by mouth daily       Vitamin D3 (CHOLECALCIFEROL) 25 mcg (1000 units) tablet Take 1 tablet by mouth daily         MED REC REQUIRED{TIP  Click the link below to document or use med rec list, use list to pull in response :252793}  Post Medication Reconciliation Status: {MED REC LIST:569192}    REVIEW OF SYSTEMS:  {gdsisd12:171927}    Objective:   There were no vitals taken for this visit.  {long term physical exam :821740}    {fgslab:056112}  CBC RESULTS:   Recent Labs   Lab Test 04/22/22  0940 01/21/21  0727   WBC 5.8 10.6   RBC 4.15 3.97   HGB 13.0 11.7*   HCT 39.7 37.2   MCV 96 94   MCH 31.3 29.5   MCHC 32.7 31.5*   RDW 12.7 13.2    300       Last Basic Metabolic Panel:  Recent Labs   Lab Test 04/22/22  0940 02/01/21  0831 12/27/20  0610    142 138   POTASSIUM 3.8  --  3.7   CHLORIDE 105  --  106   GABY 9.7  --  8.5   CO2 24  --  30   BUN 13  --  16   CR 0.82  --  0.65   *  --  93       Liver Function Studies -   Recent Labs   Lab Test 12/26/20  1140 07/08/19  0824   PROTTOTAL 7.0 7.2   ALBUMIN 3.3* 3.9   BILITOTAL 1.1 0.5   ALKPHOS 85 76   AST 27 14   ALT 16 18       No results found for: TSH]    No results found for: A1C    Assessment/Plan:  No diagnosis found.    Electronically signed by:   Dr. Alexandra Guzman, APRN, DNP, AGNP-BC, PMHNP-BC  [facility]  Office Hours: Tues-Fri 7545-0905  Office: [address] [place] [address]  Fax - [phone]  Triage Phone- [phone]  Business Office- [phone]      Email: [email or web address]               Sincerely,        MATTIE Keith CNP

## 2023-01-26 NOTE — PROGRESS NOTES
Current Outpatient Medications   Medication Sig Dispense Refill     acetaminophen (TYLENOL) 500 MG tablet Take 1,000 mg by mouth every 8 hours as needed for mild pain       FLUoxetine (PROZAC) 20 MG capsule TAKE 1 CAPSULE BY MOUTH TWICE DAILY 180 capsule 97     loperamide (IMODIUM) 2 MG capsule Take 1 capsule (2 mg) by mouth 2 times daily X 5 days 10 capsule 0     magnesium hydroxide (MILK OF MAGNESIA) 400 MG/5ML suspension Take 5 mLs by mouth daily as needed for constipation or heartburn       melatonin 3 MG tablet Take 1 mg by mouth At Bedtime       molnupiravir (LAGEVRIO) 200 MG capsule Take 4 capsules (800 mg) by mouth every 12 hours 40 each 0     order for DME Equipment being ordered: Depends used 3 per day.  Size small 7 Box 11     senna (SENOKOT) 8.6 MG tablet Take 2 tablets by mouth daily       Vitamin D3 (CHOLECALCIFEROL) 25 mcg (1000 units) tablet Take 1 tablet by mouth daily       Electronically signed by:  Dr. Alexandra Guzman, APRN, DNP, AGNP-BC, PMHNP-BC  [facility]  Office Hours: Tues-Fri 1383-0060  Office: [address] [place] [address]  Fax - [phone]  Triage Phone - [phone]  Business Office- [phone]      Email: [email or web address]

## 2023-02-01 ENCOUNTER — PATIENT OUTREACH (OUTPATIENT)
Dept: GERIATRIC MEDICINE | Facility: CLINIC | Age: 78
End: 2023-02-01
Payer: COMMERCIAL

## 2023-02-05 NOTE — PROGRESS NOTES
[facility] Care Coordination Contact    2-1-2023  Meera moved from [facility] to [facility].    RS tool sent to [facility].  CC alerted CMS and E&E of relocation  Theresa Nicole RN PHN  [facility]   cell - [phone]

## 2023-02-05 NOTE — PROGRESS NOTES
[facility] Care Coordination Contact    Meera Ruvalcaba     2-1-2023   Moved to [facility] [address].  5181 sent to Marycarmen Doherty.  CMS and E&E alerted of relocation.    CC worked with VANE Howe to assist with finding location for member to move:  [facility] [address]  [phone]                    second choice [facility]  requested medical info sent to [facility] (Meera to them for nursing assessment)                                           Tour 1-18-23 at [facility] but she is not interested in a shared apt due to incontinence of bowel and bladder.  Theresa Nicole RN PHN  [facility]   cell - [phone]

## 2023-02-07 ENCOUNTER — PATIENT OUTREACH (OUTPATIENT)
Dept: GERIATRIC MEDICINE | Facility: CLINIC | Age: 78
End: 2023-02-07
Payer: COMMERCIAL

## 2023-02-07 NOTE — LETTER
February 7, 2023    Important [employer] Information    JOSEPH SIMON  [facility]  [id number]  [facility] [address]  Your Care Plan  Dear Joseph,  When we spoke recently, I promised to send you a Care Plan. The plan enclosed is a summary of our discussion. It includes the steps we agreed would help you meet your health goals. In addition, I can help you with:  Wbhlawa-M-RofkJW  This program is available to members who need a ride to medical and dental visits. To schedule a ride, call [phone] or [phone] (toll free). TTY: 711. You can call 8 a.m. to 8 p.m. Seven days a week. Access to a representative may be limited at times.   One Pass  One Pass is your no-cost, complete, fitness solution for your mind and body. To learn more visit [employer] or call One Pass, toll-free 1 [phone] (TTY: 104) 8 a.m. to 9 p.m. Monday-Friday.  Health Care Directive   This form helps you outline your health care wishes. You can request a form from me and I will answer any questions you have before you discuss it with your doctor.   Annual Physical  Take a key step on your path to good health and set up an annual physical at your clinic.  Questions?  Call me at [phone] Monday-Friday between 8am and 5pm.  TTY: 71. As we discussed, I plan to be in touch with you again in 6 months to follow up via phone.  Sincerely,    Theresa Nicole RN    E-mail: [email or web address]  Phone: [phone]      [employer]    cc: member records                                                                                             CB5 ([facility]) (5-2020)    Civil Rights Notice  Discrimination is against the law. [facility] does not discriminate on the basis of any of the following:    Race    Color    National Origin    Creed    Jain    Age    Public Assistance Status    Receipt of Health Care Services    Disability (including physical or mental impairment)    Sex (including sex stereotypes and  gender identity)    Marital Status    Political Beliefs    Medical Condition    Genetic Information    Sexual Orientation    Claims Experience    Medical History    Health Status    Auxiliary Aids and Services:  [employer] provides auxiliary aids and services, like qualified interpreters or information in accessible formats, free of charge and in a timely manner, to ensure an equal opportunity to participate in our health care programs. Contact [employer] at [employer]/contact medicaid or call [phone] (toll free); TTY:713 or at [employer]/contactmedicaid.    Language Assistance Services:  [employer] provides translated documents and spoken language interpreting, free of charge and in a timely manner, when language assistance services are necessary to ensure limited English speakers have meaningful access to our information and services. Contact [employer] at [phone] (toll free); TTY: 596 or [employer]/contactmedicaid.     Civil Rights Complaints  You have the right to file a discrimination complaint if you believe you were treated in a discriminatory way by [facility]. You may contact any of the following four agencies directly to file a discrimination complaint.    U.S. Department of Health and Human Services  Office for Civil Rights (OCR)  You have the right to file a complaint with the OCR, a federal agency, if you believe you have been discriminated against because of any of the following:    Race    Disability    Color    Sex    National Origin    Age    Pentecostalism (in some cases)    Contact the OCR directly to file a complaint:         Director         U.S. Department of Health and Human Services  Office for Civil Rights         [address] [place] [address], [place] [address]         Customer Response Center: Toll-free: [phone]          TDD: [phone]         Email: [email or web address]    [place] Department of Human Rights ([facility])  In [place], you have the right to  file a complaint with the MDHR if you believe you have been discriminated against because of any of the following:      Race    Color    National Origin    Cheondoism    Creed    Sex    Sexual Orientation    Marital Status    Public Assistance Status    Disability    Contact the MDHR directly to file a complaint:         [facility] of Human Rights         [address] [address]         [phone] (voice)          [phone] (toll free)         711 or [phone] ([place] Relay)         [phone] (Fax)         [email or web address] (Email)     [place] Department of Human Services (DHS)  You have the right to file a complaint with [facility] if you believe you have been discriminated against in our health care programs because of any of the following:    Race    Color    National Origin    Creed    Cheondoism    Age    Public Assistance Status    Receipt of Health Care Services    Disability (including physical or mental impairment)    Sex (including sex stereotypes and gender identity)    Marital Status    Political Beliefs    Medical Condition    Genetic Information    Sexual Orientation    Claims Experience    Medical History    Health Status    Complaints must be in writing and filed within 180 days of the date you discovered the alleged discrimination. The complaint must contain your name and address and describe the discrimination you are complaining about. After we get your complaint, we will review it and notify you in writing about whether we have authority to investigate. If we do, we will investigate the complaint.      [facility] will notify you in writing of the investigation s outcome. You have a right to appeal the outcome if you disagree with the decision. To appeal, you must send a written request to have [facility] review the investigation outcome. Be brief and state why you disagree with the decision. Include additional information you think is important.       If you file a complaint in this way, the people who work for the agency named in the complaint cannot retaliate against you. This means they cannot punish you in any way for filing a complaint. Filing a complaint in this way does not stop you from seeking out other legal or administration actions.     Contact DHS directly to file a discrimination complaint:        Civil Rights Coordinator        [facility] of [facility] Services        Equal Opportunity and Access Division        [address] [address]        [place] [address] [phone] (voice) or use your preferred relay service     [facility] Complaint Notice   You have the right to file a complaint with [employer] if you believe you have been discriminated against because of any of the following:       Medical condition    Health status    Receipt of health care services    Claims experience    Medical history    Genetic information    Disability (including mental or physical impairment)    Marital status    Age    Sex (including sex stereotypes and gender identity)    Sexual orientation    National origin    Race    Color    Faith    Creed    Public assistance status    Political beliefs    You can file a complaint and ask for help in filing a complaint in person or by mail, phone, fax, or email at:     [facility] Civil Rights Coordinator  [facility] [employer] [facility]  [address], [place] [address] [phone] (voice and fax) or TTY:070  Email: [email or web address]    American Indians can begin or continue to use [facility] and [facility] ([facility]) clinics. We will not require prior approval or impose any conditions for you to get services at these clinics. For elders age 65 years and older this includes Elderly Waiver (EW) services accessed through the [facility]. If a doctor or other provider in a [facility] [place] or IHS clinic refers you to a provider in our network, we will not require you to see your primary care provider  prior to the referral.

## 2023-02-07 NOTE — PROGRESS NOTES
[facility] Care Coordination Contact    Received after visit chart from care coordinator.  Completed following tasks: Mailed copy of care plan to client, Updated services in Database, Submitted referrals/auths for AL & supplies and Mailed Safe Medication Disposal   , Mailed copy of CL tool to member, faxed copy to AL facility ([facility]) including Provider Signature Summary letter, and submitted authorization to health plan.   and Provider Signature - No POC Shared:  Member indicates that they do not want their POC shared with any EW providers.     Ibeth Cuellar  Case Management Specialist   [facility]  [phone]

## 2023-02-10 NOTE — PROGRESS NOTES
[facility] GERIATRICS    Chief Complaint   Patient presents with     SUSAN     [place] Medical Record Number:  [id number]  Place of Service where encounter took place:  [facility]) [[id number]]    HPI:  Meera Ruvalcaba is a 77 year old  (1945), who is being seen today for an episodic care visit at: [facility] ([facility]) [[id number]]. Today's concern is:      Generalized weakness  Recurrent falls  Lymphedema of both lower extremities  COVID-19    Patient seen today for an acute follow-up visit in [facility] per staff request after noting an decrease in mobility and ability to complete ADLs. Patient notes she does feel a bit more weak since having COVID recently. She denies pain or acute associated concerns. However, she is at increased risk for falls given her decline. Therapies previously ordered with unable to be initiated due to insurance coverage. Appetite is improving. She is sleeping well. Denies CP, palpitations, fatigue, nausea, vomiting, increased SOB/NAYAK, fever, chills, and/or b/b concerns today.    ALLERGIES:Sulfa drugs  PAST MEDICAL HISTORY:   Past Medical History:   Diagnosis Date     Ascites 7/27/2007    ICD 10     Cystocele 7/27/2007     Generalized weakness 12/26/2020     GERD (gastroesophageal reflux disease) 7/20/2007     Recurrent falls 12/26/2020     PAST SURGICAL HISTORY:   has a past surgical history that includes Lymphadenectomy (01/16/2007); TOTAL ABDOM HYSTERECTOMY (01/16/2007); and Salpingo-oophorectomy bilateral.  FAMILY HISTORY: family history includes Cancer in her brother, father, maternal grandfather, and mother; Prostate Cancer in her father and maternal grandfather.  SOCIAL HISTORY:  reports that she has never smoked. She has never used smokeless tobacco. She reports that she does not drink alcohol and does not use drugs.    MEDICATIONS:  Current Outpatient Medications   Medication Sig Dispense Refill     acetaminophen (TYLENOL) 500 MG tablet Take 1,000 mg by  mouth every 8 hours as needed for mild pain       FLUoxetine (PROZAC) 20 MG capsule TAKE 1 CAPSULE BY MOUTH TWICE DAILY 180 capsule 97     loperamide (IMODIUM) 2 MG capsule Take 1 capsule (2 mg) by mouth 2 times daily X 5 days 10 capsule 0     magnesium hydroxide (MILK OF MAGNESIA) 400 MG/5ML suspension Take 5 mLs by mouth daily as needed for constipation or heartburn       melatonin 3 MG tablet Take 1 mg by mouth At Bedtime       molnupiravir (LAGEVRIO) 200 MG capsule Take 4 capsules (800 mg) by mouth every 12 hours 40 each 0     order for DME Equipment being ordered: Depends used 3 per day.  Size small 7 Box 11     senna (SENOKOT) 8.6 MG tablet Take 2 tablets by mouth daily       Vitamin D3 (CHOLECALCIFEROL) 25 mcg (1000 units) tablet Take 1 tablet by mouth daily         MED REC REQUIRED  Post Medication Reconciliation Status: patient was not discharged from an inpatient facility or TCU    REVIEW OF SYSTEMS:  10 point ROS of systems including Constitutional, Eyes, Respiratory, Cardiovascular, Gastroenterology, Genitourinary, Integumentary, Musculoskeletal, Psychiatric were all negative except for pertinent positives noted in my HPI.    Objective:   BP (P) 101/86    GENERAL APPEARANCE:  Alert, in no distress, cooperative  EYES:  EOM, conjunctivae, lids, pupils and irises normal, wears glasses  RESP:  respiratory effort and palpation of chest normal, lungs clear to auscultation , no respiratory distress  CV:  Palpation and auscultation of heart done , regular rate and rhythm, no murmur, rub, or gallop, peripheral edema 2-3+ in BLE  M/S:   Gait and station abnormal - ELOY 2/2 resident being in chair  Digits and nails abnormal - arthritic changes present  NEURO:   Cranial nerves 2-12 are normal tested and grossly at patient's baseline  PSYCH:  oriented to 2-3, insight and judgement impaired, memory impaired , affect and mood normal      Recent labs in EPIC reviewed by me today.   CBC RESULTS:   Recent Labs   Lab Test  04/22/22  0940 01/21/21  0727   WBC 5.8 10.6   RBC 4.15 3.97   HGB 13.0 11.7*   HCT 39.7 37.2   MCV 96 94   MCH 31.3 29.5   MCHC 32.7 31.5*   RDW 12.7 13.2    300       Last Basic Metabolic Panel:  Recent Labs   Lab Test 04/22/22  0940 02/01/21  0831 12/27/20  0610    142 138   POTASSIUM 3.8  --  3.7   CHLORIDE 105  --  106   GABY 9.7  --  8.5   CO2 24  --  30   BUN 13  --  16   CR 0.82  --  0.65   *  --  93       Liver Function Studies -   Recent Labs   Lab Test 12/26/20  1140 07/08/19  0824   PROTTOTAL 7.0 7.2   ALBUMIN 3.3* 3.9   BILITOTAL 1.1 0.5   ALKPHOS 85 76   AST 27 14   ALT 16 18       Assessment/Plan:    ICD-10-CM    1. Generalized weakness  R53.1 Acute on chronic - unstable. Increased   2. Recurrent falls  R29.6 Weakness noted as per HPI since COVID   3. Lymphedema of both lower extremities  I89.0 Diagnosis in December - resident agrees   4. COVID-19  U07.1 To meet with therapies - PT/OT eval/tx for safety and strengthening - hopeful that with new year her insurance will cover these therapies.        MDM:  Assessment requiring an independent historian(s) - assisted living staff  Discussion of management or test interpretation with external physician/other qualified healthcare professional/appropriate source - assisted living staff  Diagnosis or treatment significantly limited by social determinants of health - [facility]  Ordering of appropriate home care services    Electronically signed by:   Dr. Alexandra Guzman, APRN, DNP, AGNP-BC, PMHNP-BC  [facility]  Office Hours: Tues-Fri [phone]  Office: [address] [place] [address]  Fax - [phone]  Triage Phone- [phone]  Business Office- [phone]      Email: [email or web address]     ------------------------------------  [facility] GERIATRICS  PRIMARY CARE PROVIDER AND CLINIC:  MATTIE Keith CNP, [address] / [place] [address]  Chief Complaint   Patient presents with      [facility] Medical Record Number:  [id number]  Place of Service where encounter took place:  [facility] ([facility]) [[id number]]    Meera Ruvalcaba  is a 76 year old  (1945), living in above facility since 3/1/22 and now choosing to change PCPs to [facility].    HPI:    This is a 76-year-old female, with a past medical history significant for a small bowel obstruction 1/12/22 secondary to adhesions managed conservatively, liver lesion on abdominal CT 1/13/22, depression, recurrent falls, cystocele, GERD, endometrial cancer s/p VIJI/BSO aortic lymphadenectomy and bilateral pelvic lymphadenectomy 2007, and lymphedema of bilateral lower extremities, who moved to [facility] on 2/28/22. She has now elected to be followed by [facility].     Today, patient is seen sitting in the main dining room between meal times. Has lived in [place] most of her life. Lived in [place], [place] for three years because she was  to a . Lived in  housing and had to deal with large cockroaches. Gave birth to a stillborn, eight and a half month old daughter. Doctors recommended she not look after her daughter after she delivered. Was the most difficult experience of her life. Worked as a . 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 The restaurant caused her father to have a nervous breakdown, but she enjoyed owing it. After the restaurant closed, went to work across the street.  was a . 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 Loves cats and takes care of them when she can. Has a brother. Parents passed away from cancer. Is grateful for the services hospice provided when her parents passed away.    Would describe herself as overall healthy. States her legs are so weak and this is why she's in an assisted living. Uses a walker to get around. Has swelling in her legs secondary to  radiation treatment for her cancer. Refused chemotherapy treatment. Had frequent gallbladder attacks which then led to her ovaries and uterus being removed as well as her lymph nodes. Has had edema every since her lymph nodes were removed. Requires assistance from staff for laundry and showers. Also gets help with her black compression stockings.    Had a fall over the weekend. Laid on the floor for an hour and twenty minutes before staff was able to assist her up. Had no injuries. Has no pain or shortness of breath    CODE STATUS/ADVANCE DIRECTIVES DISCUSSION:  No CPR- Do NOT Intubate  DNR / DNI  ALLERGIES:   Allergies   Allergen Reactions     Sulfa Drugs Other (See Comments)     Childhood reaction       PAST MEDICAL HISTORY:   Past Medical History:   Diagnosis Date     Ascites 7/27/2007    ICD 10     Cystocele 7/27/2007     Generalized weakness 12/26/2020     GERD (gastroesophageal reflux disease) 7/20/2007     Recurrent falls 12/26/2020      PAST SURGICAL HISTORY:   has no past surgical history on file.  FAMILY HISTORY: family history includes Cancer in her brother, father, maternal grandfather, and mother; Prostate Cancer in her father and maternal grandfather.  SOCIAL HISTORY:   reports that she has never smoked. She has never used smokeless tobacco. She reports that she does not drink alcohol and does not use drugs.  Patient's living condition: lives in an assisted living facility    Post Discharge Medication Reconciliation Status:   Post Medication Reconciliation Status:      Current Outpatient Medications   Medication Sig     acetaminophen (TYLENOL) 500 MG tablet Take 1,000 mg by mouth every 8 hours as needed for mild pain     FLUoxetine (PROZAC) 20 MG capsule Take 20 mg by mouth 2 times daily     magnesium hydroxide (MILK OF MAGNESIA) 400 MG/5ML suspension Take 5 mLs by mouth daily as needed for constipation or heartburn     melatonin 3 MG tablet Take 1 mg by mouth At Bedtime     order for DME Equipment  "being ordered: Depends used 3 per day.  Size small     senna (SENOKOT) 8.6 MG tablet Take 2 tablets by mouth daily     Vitamin D3 (CHOLECALCIFEROL) 25 mcg (1000 units) tablet Take 1 tablet by mouth daily     acetaminophen (TYLENOL) 325 MG tablet Take 2 tablets (650 mg) by mouth every 6 hours as needed for pain     melatonin 1 MG TABS tablet Take 1 tablet (1 mg) by mouth nightly as needed for sleep     polyethylene glycol (MIRALAX) 17 GM/Dose powder Take 17 g by mouth daily     No current facility-administered medications for this visit.     ROS:  4 point ROS including Respiratory, CV, GI and , other than that noted in the HPI,  is negative    Vitals:  BP (!) 153/68   Pulse 93   Temp (!) 95.9  F (35.5  C)   Resp 16   Ht 1.397 m (4' 7\")   Wt 49.3 kg (108 lb 11.2 oz)   BMI 25.26 kg/m    Exam:  GENERAL APPEARANCE:  Alert, in no distress  ENT:  Mouth and posterior oropharynx normal, moist mucous membranes  EYES:  EOM, conjunctivae, lids, pupils and irises normal  RESP:  respiratory effort and palpation of chest normal, lungs clear to auscultation , no respiratory distress  CV:  Palpation and auscultation of heart done , regular rate and rhythm, no murmur, rub, or gallop  ABDOMEN:  normal bowel sounds, soft, nontender, no hepatosplenomegaly or other masses  M/S:   Lymphedema in BLE  SKIN:  Inspection of skin and subcutaneous tissue baseline, Palpation of skin and subcutaneous tissue baseline  NEURO:   Cranial nerves 2-12 are normal tested and grossly at patient's baseline  PSYCH:  affect and mood normal    Lab/Diagnostic data:  Labs done in SNF are in [facility]. Please refer to them using [employer] Everywhere.    ASSESSMENT/PLAN:  Recurrent Falls. With recent fall over the weekend. Uses walker for mobility. Has been on home therapy in the past. Staff to provide assistance as indicated.    Lymphedema. With history of aortic lymphadenectomy and bilateral pelvic lymphadenectomy. Continue compression stockings as " "ordered.     Depression. Continue Fluoxetine as ordered.    Insomnia. Continue Melatonin as ordered.    Constipation with History of Small Bowel Obstruction in January 2022. Continue Senna as ordered.    Abnormal Imaging. Noted on 1/13/22 abdominal CT \"1.2 cm hypodensity in the right hepatic lobe\". Recommended liver MRI within 3 months. Will need to follow-up with patient to determine if this has been followed up on.    Orders:  None    Electronically signed by:  MATTIE Martinez CNP                 "

## 2023-02-10 NOTE — PROGRESS NOTES
[facility] GERIATRICS  NON-FACE TO FACE PROLONGED SERVICE    Meera Ruvalcaba 1945    Date of Related Face to Face Service: 1/10/23    INTENT OF SERVICE  This is an extended evaluation of patient's specific problem.  The service relates to a recent or future E/M visit.  Documentation supports medical necessity, relates to ongoing patient management and documents start times and stop times.    Regarding the following diagnoses:     Generalized weakness  Recurrent falls  Lymphedema of both lower extremities  COVID-19  Diarrhea, unspecified type  Lymphedema  Muscle weakness (generalized)  Recurrent major depressive disorder, remission status unspecified (H)  Constipation, unspecified constipation type  Insomnia, unspecified type,   * I reviewed records for patient's complicated medical history.  (Start time 1105  Stop time 1122)  * I coordinated care with [facility] facility member.  (Start time 1123  Stop time 1129)  * I compiled all necessary documents for discharge for the patient and signed all necessary documents (Start time 1130 Stop time 1142)    ASSESSMENT/PLAN     Generalized weakness  Recurrent falls  Lymphedema of both lower extremities  COVID-19  Diarrhea, unspecified type  Lymphedema  Muscle weakness (generalized)  Recurrent major depressive disorder, remission status unspecified (H)  Constipation, unspecified constipation type  Insomnia, unspecified type    Patient to discharge to [facility] on date 2/7/23 - all necessary documentation completed, all forms signed, and all necessary admission paperwork compiled for seamless transfer    TIME  Total time spent with Non-Face to Face prolonged service 43 minutes.     Electronically signed by:  Dr. Alexandra Guzman, APRN, DNP, AGNP-BC, PMHNP-BC  [facility]  Office Hours: Tues-Fri [phone]  Office: [address] [place] [address]  Fax - [phone]  Triage Phone - [phone]  Business Office- [phone]      Email:  [email or web address]

## 2023-02-10 NOTE — PROGRESS NOTES
Resident chart was reviewed today, orders confirmed, medical history reviewed, active problems verified, and medication list updated in preparation for upcoming discharge to [facility] on date 2/7/23. This encounter includes the following to help aide in a seamless discharge for this patient into the appropriate setting of choice:    Signed medication list    Most recent visit note    Orders for admission to appropriate facility    Dr. Alexandra Guzman, APRN, DNP, AGNP-BC, PMHNP-BC  [facility]  Office Hours: Tues-Fri [phone]  Office: [address] [place] [address]  Fax - [phone]  Triage Phone - [phone]  Business Office- [phone]      Email: [email or web address]

## 2023-02-10 NOTE — PROGRESS NOTES
Okay to admit to [facility] with current medications and treatments as ordered     Okay for PT/OT eval/tx PRN following admission for strengthening and safety    Dr. Alexandra Guzman, APRN, DNP, AGNP-BC, PMHNP-BC  [facility]  Office Hours: Tues-Fri [phone]  Office: [address] [place] [address]  Fax - [phone]  Triage Phone - [phone]  Business Office- [phone]      Email: [email or web address]

## 2023-03-01 NOTE — TELEPHONE ENCOUNTER
Wipes, pads, chux & briefs delivered per [facility]. CC notified.    Ibeth Cuellar  Case Management Specialist   [facility]  [phone]

## 2023-03-09 ENCOUNTER — TELEPHONE (OUTPATIENT)
Dept: GERIATRIC MEDICINE | Facility: CLINIC | Age: 78
End: 2023-03-09
Payer: COMMERCIAL

## 2023-03-09 DIAGNOSIS — R53.1 GENERALIZED WEAKNESS: Primary | ICD-10-CM

## 2023-03-09 NOTE — TELEPHONE ENCOUNTER
Hi Alexandra -   DME supply(s) have been requested by the patient. DME orders(s) have been queued up and pended for the provider to review. Patient reports the need for DME supplies due to fall risk in shower. Once completed, please route the encounter to care coordinator to fax completed documentation and order requisition to [facility] GLENYS Dennis  [facility]   Cell - [phone]  .

## 2023-03-13 ENCOUNTER — PATIENT OUTREACH (OUTPATIENT)
Dept: GERIATRIC MEDICINE | Facility: CLINIC | Age: 78
End: 2023-03-13
Payer: COMMERCIAL

## 2023-03-13 NOTE — PROGRESS NOTES
"[facility] Care Coordination Contact    3-13-23  CC has been working with [facility] [employer] since November to get member a transport w/c per POA request, numerous issues with delivery of the is w/c, so CC cancelled order.    It was determined member actually needs a standard w/c.    Rx and F2F with demographic sent to [facility] on 3-10-23.  Return response today:  she would need a super tamia wheelchair which we do not provide\"    CC reached out to therapist:  Genevieve Parra), MS, OTR/L, CLT  Leanne  M: [phone]  F: [phone]     [email or web address]  [email or web address]    Her recommendation:  I would try [employer]. They would likely want to come out and assess her face-to-face, which would be ok. I have worked with them in the past and they move pretty quick. I would recommend specifying she only needs a manual wheelchair with several modifications (height, backrest) though, not a power wheelchair.      CC contacted Sanjaan with [employer]:  [employer]   [address] [place] [address] [address], [place] [address]     [phone]    F2F, demographics and Rx Faxed to:  [phone]   Meet with therapist to size the w/c for member.    Await further direction  Theresa Nicole RN PHN  [facility]   Cell - [phone]    "

## 2023-03-15 NOTE — PROGRESS NOTES
[facility] Care Coordination Contact    2nd Attempt: Signed Letter not received from [facility], resent per process.     Jyothi Rashid  Care Management Specialist   [facility]   [phone]

## 2023-03-27 ENCOUNTER — PATIENT OUTREACH (OUTPATIENT)
Dept: GERIATRIC MEDICINE | Facility: CLINIC | Age: 78
End: 2023-03-27
Payer: COMMERCIAL

## 2023-03-27 NOTE — PROGRESS NOTES
[facility] Care Coordination Contact    No longer active with [facility] case management effective 3-31-23.  Reason for community disenrollment: transfer to [facility].    CMS will sent transfer paperwork to [facility].  [facility] -- Sunitha Chun NP    Documents included:  UTF, POC, LTCC (HRA), Med List, OBRA, Case Notes, POC signature page, current auth and 3496    Theresa Nicole RN PHN  [facility]   [place] - [phone]

## 2023-03-27 NOTE — TELEPHONE ENCOUNTER
3-9-23  CC sent fax to [facility] with rx and demographic for member for a w/c per request of SHRUTI Vallejo with [facility] [place] (see email)  GLENYS oRdriguez  [facility]   Cell - [phone]    3-23-23  \call from Genevieve to inquire status of w/c, as they will be discharge her soon from OT.    CC contacted [facility], they did not have the fax on file.  Resent today, received call of verification it was received and they will reach out to Genevieve for a co-visit.    CC notified Genevieve of this - she will await call or contact [facility] directly.  GLENYS Rodriguez  [facility]   Cell - [phone]

## 2023-10-30 ENCOUNTER — LAB REQUISITION (OUTPATIENT)
Dept: LAB | Facility: CLINIC | Age: 78
End: 2023-10-30
Payer: COMMERCIAL

## 2023-10-30 DIAGNOSIS — E87.6 HYPOKALEMIA: ICD-10-CM

## 2023-10-30 DIAGNOSIS — I10 ESSENTIAL (PRIMARY) HYPERTENSION: ICD-10-CM

## 2023-10-31 LAB
ANION GAP SERPL CALCULATED.3IONS-SCNC: 10 MMOL/L (ref 7–15)
BASOPHILS # BLD AUTO: 0 10E3/UL (ref 0–0.2)
BASOPHILS NFR BLD AUTO: 1 %
BUN SERPL-MCNC: 15.2 MG/DL (ref 8–23)
CALCIUM SERPL-MCNC: 9.3 MG/DL (ref 8.8–10.2)
CHLORIDE SERPL-SCNC: 105 MMOL/L (ref 98–107)
CREAT SERPL-MCNC: 0.67 MG/DL (ref 0.51–0.95)
DEPRECATED HCO3 PLAS-SCNC: 26 MMOL/L (ref 22–29)
EGFRCR SERPLBLD CKD-EPI 2021: 90 ML/MIN/1.73M2
EOSINOPHIL # BLD AUTO: 0.1 10E3/UL (ref 0–0.7)
EOSINOPHIL NFR BLD AUTO: 2 %
ERYTHROCYTE [DISTWIDTH] IN BLOOD BY AUTOMATED COUNT: 13.1 % (ref 10–15)
GLUCOSE SERPL-MCNC: 86 MG/DL (ref 70–99)
HCT VFR BLD AUTO: 35.1 % (ref 35–47)
HGB BLD-MCNC: 11.4 G/DL (ref 11.7–15.7)
IMM GRANULOCYTES # BLD: 0 10E3/UL
IMM GRANULOCYTES NFR BLD: 1 %
LYMPHOCYTES # BLD AUTO: 1.9 10E3/UL (ref 0.8–5.3)
LYMPHOCYTES NFR BLD AUTO: 28 %
MCH RBC QN AUTO: 30.2 PG (ref 26.5–33)
MCHC RBC AUTO-ENTMCNC: 32.5 G/DL (ref 31.5–36.5)
MCV RBC AUTO: 93 FL (ref 78–100)
MONOCYTES # BLD AUTO: 0.4 10E3/UL (ref 0–1.3)
MONOCYTES NFR BLD AUTO: 5 %
NEUTROPHILS # BLD AUTO: 4.3 10E3/UL (ref 1.6–8.3)
NEUTROPHILS NFR BLD AUTO: 63 %
NRBC # BLD AUTO: 0 10E3/UL
NRBC BLD AUTO-RTO: 0 /100
PLATELET # BLD AUTO: 402 10E3/UL (ref 150–450)
POTASSIUM SERPL-SCNC: 4.4 MMOL/L (ref 3.4–5.3)
RBC # BLD AUTO: 3.78 10E6/UL (ref 3.8–5.2)
SODIUM SERPL-SCNC: 141 MMOL/L (ref 135–145)
WBC # BLD AUTO: 6.6 10E3/UL (ref 4–11)

## 2023-10-31 PROCEDURE — 80048 BASIC METABOLIC PNL TOTAL CA: CPT | Mod: ORL | Performed by: NURSE PRACTITIONER

## 2023-10-31 PROCEDURE — P9604 ONE-WAY ALLOW PRORATED TRIP: HCPCS | Mod: ORL | Performed by: NURSE PRACTITIONER

## 2023-10-31 PROCEDURE — 36415 COLL VENOUS BLD VENIPUNCTURE: CPT | Mod: ORL | Performed by: NURSE PRACTITIONER

## 2023-10-31 PROCEDURE — 85025 COMPLETE CBC W/AUTO DIFF WBC: CPT | Mod: ORL | Performed by: NURSE PRACTITIONER
